# Patient Record
Sex: FEMALE | Race: WHITE | Employment: OTHER
[De-identification: names, ages, dates, MRNs, and addresses within clinical notes are randomized per-mention and may not be internally consistent; named-entity substitution may affect disease eponyms.]

---

## 2017-03-30 ENCOUNTER — MYAURORA ACCOUNT LINK (OUTPATIENT)
Dept: OTHER | Age: 72
End: 2017-03-30

## 2017-03-30 ENCOUNTER — PRIOR ORIGINAL RECORDS (OUTPATIENT)
Dept: OTHER | Age: 72
End: 2017-03-30

## 2017-03-30 ENCOUNTER — HOSPITAL ENCOUNTER (OUTPATIENT)
Dept: CARDIOLOGY CLINIC | Facility: HOSPITAL | Age: 72
Discharge: HOME | End: 2017-03-30
Attending: INTERNAL MEDICINE

## 2017-03-30 DIAGNOSIS — I65.23 BILATERAL CAROTID ARTERY STENOSIS: ICD-10-CM

## 2017-05-01 ENCOUNTER — PRIOR ORIGINAL RECORDS (OUTPATIENT)
Dept: OTHER | Age: 72
End: 2017-05-01

## 2017-05-01 ENCOUNTER — MYAURORA ACCOUNT LINK (OUTPATIENT)
Dept: OTHER | Age: 72
End: 2017-05-01

## 2017-05-16 ENCOUNTER — HOSPITAL (OUTPATIENT)
Dept: OTHER | Age: 72
End: 2017-05-16
Attending: OBSTETRICS & GYNECOLOGY

## 2017-10-16 PROBLEM — Z83.3 FH: DIABETES MELLITUS: Status: ACTIVE | Noted: 2017-10-16

## 2017-11-16 ENCOUNTER — HOSPITAL (OUTPATIENT)
Dept: OTHER | Age: 72
End: 2017-11-16
Attending: OBSTETRICS & GYNECOLOGY

## 2017-11-17 ENCOUNTER — LAB ENCOUNTER (OUTPATIENT)
Dept: LAB | Age: 72
End: 2017-11-17
Attending: INTERNAL MEDICINE
Payer: MEDICARE

## 2017-11-17 ENCOUNTER — PRIOR ORIGINAL RECORDS (OUTPATIENT)
Dept: OTHER | Age: 72
End: 2017-11-17

## 2017-11-17 DIAGNOSIS — E78.2 MIXED HYPERLIPIDEMIA: Primary | ICD-10-CM

## 2017-11-17 PROCEDURE — 36415 COLL VENOUS BLD VENIPUNCTURE: CPT

## 2017-11-17 PROCEDURE — 80061 LIPID PANEL: CPT

## 2017-11-17 PROCEDURE — 80053 COMPREHEN METABOLIC PANEL: CPT

## 2017-12-13 LAB
ALBUMIN: 3.9 G/DL
ALKALINE PHOSPHATATE(ALK PHOS): 78 IU/L
BILIRUBIN TOTAL: 0.7 MG/DL
BUN: 14 MG/DL
CALCIUM: 9 MG/DL
CHLORIDE: 107 MEQ/L
CHOLESTEROL, TOTAL: 187 MG/DL
CREATININE, SERUM: 0.78 MG/DL
GLUCOSE: 93 MG/DL
HDL CHOLESTEROL: 88 MG/DL
LDL CHOLESTEROL: 87 MG/DL
POTASSIUM, SERUM: 4.5 MEQ/L
PROTEIN, TOTAL: 7 G/DL
SGOT (AST): 25 IU/L
SGPT (ALT): 29 IU/L
SODIUM: 142 MEQ/L
TRIGLYCERIDES: 60 MG/DL

## 2018-05-07 ENCOUNTER — MYAURORA ACCOUNT LINK (OUTPATIENT)
Dept: OTHER | Age: 73
End: 2018-05-07

## 2018-05-07 ENCOUNTER — PRIOR ORIGINAL RECORDS (OUTPATIENT)
Dept: OTHER | Age: 73
End: 2018-05-07

## 2018-12-04 PROCEDURE — 86803 HEPATITIS C AB TEST: CPT | Performed by: INTERNAL MEDICINE

## 2018-12-04 PROCEDURE — 36415 COLL VENOUS BLD VENIPUNCTURE: CPT | Performed by: INTERNAL MEDICINE

## 2018-12-05 ENCOUNTER — LAB ENCOUNTER (OUTPATIENT)
Dept: LAB | Age: 73
End: 2018-12-05
Attending: INTERNAL MEDICINE
Payer: MEDICARE

## 2018-12-05 ENCOUNTER — PRIOR ORIGINAL RECORDS (OUTPATIENT)
Dept: OTHER | Age: 73
End: 2018-12-05

## 2018-12-05 DIAGNOSIS — E78.2 MIXED HYPERLIPIDEMIA: Primary | ICD-10-CM

## 2018-12-05 PROCEDURE — 80061 LIPID PANEL: CPT

## 2018-12-05 PROCEDURE — 36415 COLL VENOUS BLD VENIPUNCTURE: CPT

## 2018-12-07 LAB
CHOLESTEROL, TOTAL: 188 MG/DL
HDL CHOLESTEROL: 79 MG/DL
LDL CHOLESTEROL: 98 MG/DL
TRIGLYCERIDES: 54 MG/DL

## 2019-01-25 PROBLEM — D12.0 BENIGN NEOPLASM OF CECUM: Status: ACTIVE | Noted: 2019-01-25

## 2019-01-25 PROBLEM — K64.8 OTHER HEMORRHOIDS: Status: ACTIVE | Noted: 2019-01-25

## 2019-01-25 PROBLEM — B96.81 HELICOBACTER PYLORI (H. PYLORI) AS THE CAUSE OF DISEASES CLASSIFIED ELSEWHERE: Status: ACTIVE | Noted: 2019-01-25

## 2019-01-25 PROBLEM — Z86.0100 PERSONAL HISTORY OF COLONIC POLYPS: Status: ACTIVE | Noted: 2019-01-25

## 2019-01-25 PROBLEM — K63.5 POLYP OF COLON: Status: ACTIVE | Noted: 2019-01-25

## 2019-01-25 PROBLEM — Z86.010 PERSONAL HISTORY OF COLONIC POLYPS: Status: ACTIVE | Noted: 2019-01-25

## 2019-01-25 PROBLEM — D12.4 BENIGN NEOPLASM OF DESCENDING COLON: Status: ACTIVE | Noted: 2019-01-25

## 2019-02-27 ENCOUNTER — HOSPITAL (OUTPATIENT)
Dept: OTHER | Age: 74
End: 2019-02-27
Attending: OBSTETRICS & GYNECOLOGY

## 2019-02-28 VITALS
WEIGHT: 122 LBS | SYSTOLIC BLOOD PRESSURE: 122 MMHG | BODY MASS INDEX: 19.61 KG/M2 | HEIGHT: 66 IN | DIASTOLIC BLOOD PRESSURE: 70 MMHG | HEART RATE: 70 BPM

## 2019-03-01 VITALS
HEART RATE: 70 BPM | BODY MASS INDEX: 19.77 KG/M2 | DIASTOLIC BLOOD PRESSURE: 64 MMHG | WEIGHT: 123 LBS | SYSTOLIC BLOOD PRESSURE: 118 MMHG | HEIGHT: 66 IN

## 2019-03-25 RX ORDER — FEXOFENADINE HCL 180 MG/1
TABLET ORAL
COMMUNITY
Start: 2010-05-25

## 2019-03-25 RX ORDER — NAPROXEN SODIUM 220 MG
TABLET ORAL
COMMUNITY
Start: 2010-05-25 | End: 2020-06-08 | Stop reason: ALTCHOICE

## 2019-03-25 RX ORDER — PHENOL 1.4 %
AEROSOL, SPRAY (ML) MUCOUS MEMBRANE
COMMUNITY
Start: 2010-05-25 | End: 2020-06-08 | Stop reason: CLARIF

## 2019-03-25 RX ORDER — SIMVASTATIN 10 MG
TABLET ORAL
COMMUNITY
Start: 2018-05-22 | End: 2019-05-07 | Stop reason: SDUPTHER

## 2019-03-25 RX ORDER — ESTRADIOL 10 UG/1
INSERT VAGINAL
COMMUNITY
Start: 2010-05-25 | End: 2019-05-06 | Stop reason: SDUPTHER

## 2019-03-28 DIAGNOSIS — I65.23 BILATERAL CAROTID ARTERY OCCLUSION: Primary | ICD-10-CM

## 2019-04-23 ENCOUNTER — HOSPITAL ENCOUNTER (OUTPATIENT)
Dept: CARDIOLOGY CLINIC | Facility: HOSPITAL | Age: 74
Discharge: HOME | End: 2019-04-23
Attending: INTERNAL MEDICINE
Payer: MEDICARE

## 2019-04-23 DIAGNOSIS — I65.23 BILATERAL CAROTID ARTERY STENOSIS: ICD-10-CM

## 2019-04-23 PROCEDURE — 93880 EXTRACRANIAL BILAT STUDY: CPT | Performed by: INTERNAL MEDICINE

## 2019-04-24 ENCOUNTER — TELEPHONE (OUTPATIENT)
Dept: CARDIOLOGY | Age: 74
End: 2019-04-24

## 2019-04-24 DIAGNOSIS — I65.23 BILATERAL CAROTID ARTERY OCCLUSION: ICD-10-CM

## 2019-05-07 ENCOUNTER — OFFICE VISIT (OUTPATIENT)
Dept: CARDIOLOGY | Age: 74
End: 2019-05-07

## 2019-05-07 DIAGNOSIS — E78.2 HYPERLIPIDEMIA, MIXED: ICD-10-CM

## 2019-05-07 DIAGNOSIS — I65.23 ASYMPTOMATIC CAROTID ARTERY STENOSIS, BILATERAL: Primary | ICD-10-CM

## 2019-05-07 PROBLEM — E78.5 DYSLIPIDEMIA: Status: ACTIVE | Noted: 2019-05-07

## 2019-05-07 PROCEDURE — 99214 OFFICE O/P EST MOD 30 MIN: CPT | Performed by: NURSE PRACTITIONER

## 2019-05-07 RX ORDER — SIMVASTATIN 10 MG
10 TABLET ORAL AT BEDTIME
Qty: 90 TABLET | Refills: 3 | Status: SHIPPED | OUTPATIENT
Start: 2019-05-07 | End: 2020-07-10

## 2019-05-07 SDOH — HEALTH STABILITY: PHYSICAL HEALTH: ON AVERAGE, HOW MANY DAYS PER WEEK DO YOU ENGAGE IN MODERATE TO STRENUOUS EXERCISE (LIKE A BRISK WALK)?: 3 DAYS

## 2019-05-07 SDOH — HEALTH STABILITY: PHYSICAL HEALTH: ON AVERAGE, HOW MANY MINUTES DO YOU ENGAGE IN EXERCISE AT THIS LEVEL?: 60 MIN

## 2019-05-07 ASSESSMENT — PATIENT HEALTH QUESTIONNAIRE - PHQ9
1. LITTLE INTEREST OR PLEASURE IN DOING THINGS: NOT AT ALL
2. FEELING DOWN, DEPRESSED OR HOPELESS: NOT AT ALL
SUM OF ALL RESPONSES TO PHQ9 QUESTIONS 1 AND 2: 0
SUM OF ALL RESPONSES TO PHQ9 QUESTIONS 1 AND 2: 0

## 2019-05-07 ASSESSMENT — ENCOUNTER SYMPTOMS
EYES NEGATIVE: 1
CONSTITUTIONAL NEGATIVE: 1
NEUROLOGICAL NEGATIVE: 1
ENDOCRINE NEGATIVE: 1
GASTROINTESTINAL NEGATIVE: 1
HEMATOLOGIC/LYMPHATIC NEGATIVE: 1
RESPIRATORY NEGATIVE: 1

## 2019-05-07 ASSESSMENT — PAIN SCALES - GENERAL: PAINLEVEL: 0

## 2019-08-10 ENCOUNTER — HOSPITAL ENCOUNTER (OUTPATIENT)
Age: 74
Discharge: HOME | End: 2019-08-10
Attending: FAMILY MEDICINE
Payer: MEDICARE

## 2019-08-10 VITALS
OXYGEN SATURATION: 97 % | TEMPERATURE: 98 F | HEART RATE: 85 BPM | SYSTOLIC BLOOD PRESSURE: 125 MMHG | DIASTOLIC BLOOD PRESSURE: 53 MMHG | RESPIRATION RATE: 16 BRPM

## 2019-08-10 DIAGNOSIS — J06.9 VIRAL UPPER RESPIRATORY TRACT INFECTION: Primary | ICD-10-CM

## 2019-08-10 PROCEDURE — 99212 OFFICE O/P EST SF 10 MIN: CPT

## 2019-08-10 PROCEDURE — 99213 OFFICE O/P EST LOW 20 MIN: CPT

## 2019-08-10 RX ORDER — FLUTICASONE PROPIONATE 50 MCG
SPRAY, SUSPENSION (ML) NASAL
Qty: 1 INHALER | Refills: 0 | Status: SHIPPED | OUTPATIENT
Start: 2019-08-10 | End: 2020-01-23

## 2019-08-10 NOTE — ED INITIAL ASSESSMENT (HPI)
Complains of head and nose congestion on and off for the last two days accompanied by cough. Denies sore throat. Feeling fatigued. Denies urinary symptoms.

## 2019-08-10 NOTE — ED PROVIDER NOTES
Patient Seen in: [facility]    History   Patient presents with:  Nasal Congestion    Stated Complaint: FEVER/CONGESTION/CHILLS X 2 DAYS    HPI    This 61-year-old female presents to the office with a 2-day history of shaking chi (36.8 °C)   Temp src Oral   SpO2 97 %   O2 Device None (Room air)       Current:/53   Pulse 85   Temp 98.3 °F (36.8 °C) (Oral)   Resp 16   LMP  (LMP Unknown)   SpO2 97%         Physical Exam    General: WH/WN/WD, in no respiratory distress, A and O t as you are congested. Push fluids. Humidified Air. Saline nasal spray. You may use a Neti pot for sinus rinses with saline. You may alternate Tylenol with Ibuprofen every 3 hours for fever or pain.   Go to the closest Emergency Department if you develop l

## 2019-12-13 ENCOUNTER — LAB ENCOUNTER (OUTPATIENT)
Dept: LAB | Age: 74
End: 2019-12-13
Attending: NURSE PRACTITIONER
Payer: MEDICARE

## 2019-12-13 DIAGNOSIS — E78.2 MIXED HYPERLIPIDEMIA: Primary | ICD-10-CM

## 2019-12-13 DIAGNOSIS — E78.5 DYSLIPIDEMIA: ICD-10-CM

## 2019-12-13 DIAGNOSIS — Z83.3 FH: DIABETES MELLITUS: ICD-10-CM

## 2019-12-13 LAB
ALBUMIN SERPL-MCNC: 3.8 G/DL
ALBUMIN/GLOB SERPL: NORMAL {RATIO}
ALP SERPL-CCNC: 75 U/L
ALT SERPL-CCNC: 22 U/L
ANION GAP SERPL CALC-SCNC: NORMAL MMOL/L
AST SERPL-CCNC: 21 U/L
BILIRUB SERPL-MCNC: 0.6 MG/DL
BUN SERPL-MCNC: 10 MG/DL
BUN/CREAT SERPL: NORMAL
CALCIUM SERPL-MCNC: 9 MG/DL
CHLORIDE SERPL-SCNC: 109 MMOL/L
CHOLEST SERPL-MCNC: 180 MG/DL
CHOLEST/HDLC SERPL: NORMAL {RATIO}
CO2 SERPL-SCNC: NORMAL MMOL/L
CREAT SERPL-MCNC: 0.8 MG/DL
GLOBULIN SER-MCNC: 3.1 G/DL
GLUCOSE SERPL-MCNC: 91 MG/DL
HDLC SERPL-MCNC: 74 MG/DL
LDLC SERPL CALC-MCNC: 95 MG/DL
LENGTH OF FAST TIME PATIENT: NORMAL H
LENGTH OF FAST TIME PATIENT: NORMAL H
NONHDLC SERPL-MCNC: 106 MG/DL
POTASSIUM SERPL-SCNC: 4.5 MMOL/L
PROT SERPL-MCNC: 6.9 G/DL
SODIUM SERPL-SCNC: 142 MMOL/L
TRIGL SERPL-MCNC: 55 MG/DL
VLDLC SERPL CALC-MCNC: NORMAL MG/DL

## 2019-12-13 PROCEDURE — 36415 COLL VENOUS BLD VENIPUNCTURE: CPT

## 2019-12-13 PROCEDURE — 80053 COMPREHEN METABOLIC PANEL: CPT

## 2019-12-13 PROCEDURE — 80061 LIPID PANEL: CPT

## 2019-12-17 ENCOUNTER — E-ADVICE (OUTPATIENT)
Dept: CARDIOLOGY | Age: 74
End: 2019-12-17

## 2019-12-23 ENCOUNTER — TELEPHONE (OUTPATIENT)
Dept: CARDIOLOGY | Age: 74
End: 2019-12-23

## 2019-12-30 ENCOUNTER — CLINICAL ABSTRACT (OUTPATIENT)
Dept: CARDIOLOGY | Age: 74
End: 2019-12-30

## 2019-12-30 DIAGNOSIS — E78.2 HYPERLIPIDEMIA, MIXED: ICD-10-CM

## 2020-01-06 ENCOUNTER — CLINICAL ABSTRACT (OUTPATIENT)
Dept: CARDIOLOGY | Age: 75
End: 2020-01-06

## 2020-01-06 DIAGNOSIS — E78.2 HYPERLIPIDEMIA, MIXED: ICD-10-CM

## 2020-02-13 ENCOUNTER — HOSPITAL ENCOUNTER (OUTPATIENT)
Dept: CT IMAGING | Facility: HOSPITAL | Age: 75
Discharge: HOME | End: 2020-02-13
Attending: INTERNAL MEDICINE

## 2020-02-13 DIAGNOSIS — Z13.9 ENCOUNTER FOR SCREENING: ICD-10-CM

## 2020-02-14 ENCOUNTER — E-ADVICE (OUTPATIENT)
Dept: CARDIOLOGY | Age: 75
End: 2020-02-14

## 2020-02-15 ENCOUNTER — E-ADVICE (OUTPATIENT)
Dept: CARDIOLOGY | Age: 75
End: 2020-02-15

## 2020-02-17 ENCOUNTER — TELEPHONE (OUTPATIENT)
Dept: CARDIOLOGY | Age: 75
End: 2020-02-17

## 2020-02-19 PROBLEM — R91.8 PULMONARY NODULES: Status: ACTIVE | Noted: 2020-02-19

## 2020-03-01 PROBLEM — M54.50 CHRONIC BILATERAL LOW BACK PAIN WITHOUT SCIATICA: Status: ACTIVE | Noted: 2020-03-01

## 2020-03-01 PROBLEM — G89.29 CHRONIC BILATERAL LOW BACK PAIN WITHOUT SCIATICA: Status: ACTIVE | Noted: 2020-03-01

## 2020-03-06 ENCOUNTER — HOSPITAL ENCOUNTER (OUTPATIENT)
Dept: GENERAL RADIOLOGY | Age: 75
Discharge: HOME | End: 2020-03-06
Attending: OBSTETRICS & GYNECOLOGY

## 2020-03-06 ENCOUNTER — HOSPITAL ENCOUNTER (OUTPATIENT)
Dept: GENERAL RADIOLOGY | Age: 75
End: 2020-03-06
Attending: OBSTETRICS & GYNECOLOGY

## 2020-03-06 ENCOUNTER — HOSPITAL ENCOUNTER (OUTPATIENT)
Dept: ULTRASOUND IMAGING | Age: 75
Discharge: HOME | End: 2020-03-06
Attending: OBSTETRICS & GYNECOLOGY

## 2020-03-06 DIAGNOSIS — N83.201 RIGHT OVARIAN CYST: ICD-10-CM

## 2020-03-06 DIAGNOSIS — M85.89 OTHER SPECIFIED DISORDERS OF BONE DENSITY AND STRUCTURE, MULTIPLE SITES: ICD-10-CM

## 2020-03-06 PROCEDURE — 77080 DXA BONE DENSITY AXIAL: CPT

## 2020-03-06 PROCEDURE — 76830 TRANSVAGINAL US NON-OB: CPT

## 2020-05-11 ENCOUNTER — APPOINTMENT (OUTPATIENT)
Dept: CARDIOLOGY | Age: 75
End: 2020-05-11

## 2020-06-08 ENCOUNTER — OFFICE VISIT (OUTPATIENT)
Dept: CARDIOLOGY | Age: 75
End: 2020-06-08

## 2020-06-08 VITALS
HEIGHT: 66 IN | HEART RATE: 66 BPM | BODY MASS INDEX: 19.61 KG/M2 | DIASTOLIC BLOOD PRESSURE: 60 MMHG | SYSTOLIC BLOOD PRESSURE: 120 MMHG | WEIGHT: 122 LBS

## 2020-06-08 DIAGNOSIS — E78.2 HYPERLIPIDEMIA, MIXED: ICD-10-CM

## 2020-06-08 DIAGNOSIS — I65.23 ASYMPTOMATIC CAROTID ARTERY STENOSIS, BILATERAL: Primary | ICD-10-CM

## 2020-06-08 DIAGNOSIS — E55.9 VITAMIN D DEFICIENCY: ICD-10-CM

## 2020-06-08 DIAGNOSIS — R91.8 LUNG NODULES: ICD-10-CM

## 2020-06-08 PROCEDURE — 99441 TELEPHONE E&M BY PHYSICIAN EST PT NOT ORIG PREV 7 DAYS 5-10 MIN: CPT | Performed by: INTERNAL MEDICINE

## 2020-06-30 PROBLEM — M54.59 MECHANICAL LOW BACK PAIN: Status: ACTIVE | Noted: 2020-06-30

## 2020-07-10 RX ORDER — SIMVASTATIN 10 MG
TABLET ORAL
Qty: 90 TABLET | Refills: 2 | Status: SHIPPED | OUTPATIENT
Start: 2020-07-10 | End: 2020-12-17 | Stop reason: SDUPTHER

## 2020-07-27 ENCOUNTER — HOSPITAL ENCOUNTER (OUTPATIENT)
Dept: CT IMAGING | Facility: HOSPITAL | Age: 75
Discharge: HOME | End: 2020-07-27
Attending: NURSE PRACTITIONER
Payer: MEDICARE

## 2020-07-27 DIAGNOSIS — E55.9 VITAMIN D DEFICIENCY: ICD-10-CM

## 2020-07-27 DIAGNOSIS — I65.23 ASYMPTOMATIC BILATERAL CAROTID ARTERY STENOSIS: ICD-10-CM

## 2020-07-27 DIAGNOSIS — E78.2 HYPERLIPIDEMIA, MIXED: ICD-10-CM

## 2020-07-27 DIAGNOSIS — R91.1 LUNG NODULE: ICD-10-CM

## 2020-07-27 PROCEDURE — 71250 CT THORAX DX C-: CPT | Performed by: NURSE PRACTITIONER

## 2020-07-28 ENCOUNTER — E-ADVICE (OUTPATIENT)
Dept: CARDIOLOGY | Age: 75
End: 2020-07-28

## 2020-08-08 DIAGNOSIS — E55.9 VITAMIN D DEFICIENCY: ICD-10-CM

## 2020-08-08 DIAGNOSIS — I65.23 ASYMPTOMATIC CAROTID ARTERY STENOSIS, BILATERAL: ICD-10-CM

## 2020-08-08 DIAGNOSIS — E78.2 HYPERLIPIDEMIA, MIXED: ICD-10-CM

## 2020-08-08 DIAGNOSIS — R91.8 LUNG NODULES: ICD-10-CM

## 2020-08-08 PROCEDURE — X1094 CT CHEST WO CONTRAST: HCPCS | Performed by: NURSE PRACTITIONER

## 2020-08-11 PROBLEM — J47.9 BRONCHIECTASIS WITHOUT COMPLICATION (HCC): Status: ACTIVE | Noted: 2020-08-11

## 2020-09-07 ENCOUNTER — HOSPITAL ENCOUNTER (OUTPATIENT)
Age: 75
Discharge: HOME | End: 2020-09-07
Payer: MEDICARE

## 2020-09-07 VITALS
HEART RATE: 70 BPM | RESPIRATION RATE: 18 BRPM | TEMPERATURE: 99 F | OXYGEN SATURATION: 99 % | SYSTOLIC BLOOD PRESSURE: 124 MMHG | DIASTOLIC BLOOD PRESSURE: 55 MMHG
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
## 2020-09-07 DIAGNOSIS — L08.9 INFECTED INSECT BITES OF MULTIPLE SITES: Primary | ICD-10-CM

## 2020-09-07 DIAGNOSIS — T07.XXXA INFECTED INSECT BITES OF MULTIPLE SITES: Primary | ICD-10-CM

## 2020-09-07 PROCEDURE — 99213 OFFICE O/P EST LOW 20 MIN: CPT

## 2020-09-07 PROCEDURE — 99214 OFFICE O/P EST MOD 30 MIN: CPT

## 2020-09-07 RX ORDER — CEPHALEXIN 500 MG/1
500 CAPSULE ORAL 4 TIMES DAILY
Qty: 40 CAPSULE | Refills: 0 | Status: SHIPPED | OUTPATIENT
Start: 2020-09-07 | End: 2020-09-17

## 2020-09-07 RX ORDER — DEXAMETHASONE 4 MG/1
16 TABLET ORAL ONCE
Status: COMPLETED | OUTPATIENT
Start: 2020-09-07 | End: 2020-09-07

## 2020-09-07 RX ORDER — FAMOTIDINE 20 MG/1
20 TABLET ORAL ONCE
Status: COMPLETED | OUTPATIENT
Start: 2020-09-07 | End: 2020-09-07

## 2020-09-07 NOTE — ED INITIAL ASSESSMENT (HPI)
Pt with multiple insect bites to body on Sat night; pt concerned of increased swelling/redness to bite on L thigh

## 2020-09-07 NOTE — ED PROVIDER NOTES
Patient Seen in: [facility]      History   Patient presents with:  Bite Sting,Insect    Stated Complaint: bites on body    HPI    81-year-old female here with complaint of multiple red welts to her lower extremities and one on t Alcohol use: Yes      Alcohol/week: 5.0 standard drinks      Types: 5 Glasses of wine per week    Drug use: No             Review of Systems    Positive for stated complaint: bites on body  Other systems are as noted in HPI.   Constitutional and vital sig Thought Content:  Thought content normal.         Judgment: Judgment normal.     see image            ED Course                  MDM     Clinical Impression: multiple insect bites with cellulitis  Course of Treatment: Take the full course of antibiotics

## 2020-12-16 ENCOUNTER — E-ADVICE (OUTPATIENT)
Dept: CARDIOLOGY | Age: 75
End: 2020-12-16

## 2020-12-17 RX ORDER — SIMVASTATIN 10 MG
10 TABLET ORAL AT BEDTIME
Qty: 90 TABLET | Refills: 2 | Status: SHIPPED | OUTPATIENT
Start: 2020-12-17

## 2020-12-23 ENCOUNTER — E-ADVICE (OUTPATIENT)
Dept: CARDIOLOGY | Age: 75
End: 2020-12-23

## 2021-02-01 ENCOUNTER — E-ADVICE (OUTPATIENT)
Dept: CARDIOLOGY | Age: 76
End: 2021-02-01

## 2021-02-04 ENCOUNTER — IMMUNIZATION (OUTPATIENT)
Dept: LAB | Age: 76
End: 2021-02-04

## 2021-02-04 DIAGNOSIS — Z23 NEED FOR VACCINATION: Primary | ICD-10-CM

## 2021-02-04 PROCEDURE — 0001A COVID 19 PFIZER-BIONTECH: CPT

## 2021-02-04 PROCEDURE — 91300 COVID 19 PFIZER-BIONTECH: CPT

## 2021-02-25 ENCOUNTER — IMMUNIZATION (OUTPATIENT)
Dept: LAB | Age: 76
End: 2021-02-25

## 2021-02-25 DIAGNOSIS — Z23 NEED FOR VACCINATION: Primary | ICD-10-CM

## 2021-02-25 PROCEDURE — 0002A COVID 19 PFIZER-BIONTECH: CPT | Performed by: HOSPITALIST

## 2021-02-25 PROCEDURE — 91300 COVID 19 PFIZER-BIONTECH: CPT | Performed by: HOSPITALIST

## 2021-03-16 ENCOUNTER — HOSPITAL ENCOUNTER (OUTPATIENT)
Dept: ULTRASOUND IMAGING | Age: 76
Discharge: HOME | End: 2021-03-16
Attending: OBSTETRICS & GYNECOLOGY

## 2021-03-16 DIAGNOSIS — N83.201 RIGHT OVARIAN CYST: ICD-10-CM

## 2021-03-16 PROCEDURE — 76856 US EXAM PELVIC COMPLETE: CPT

## 2021-03-22 PROBLEM — R20.8 DYSESTHESIA OF SCALP: Status: ACTIVE | Noted: 2021-03-22

## 2021-03-22 PROBLEM — L21.9 SEBORRHEIC DERMATITIS, UNSPECIFIED: Status: ACTIVE | Noted: 2021-03-22

## 2021-05-14 ENCOUNTER — HOSPITAL ENCOUNTER (OUTPATIENT)
Dept: CARDIOLOGY CLINIC | Facility: HOSPITAL | Age: 76
Discharge: HOME | End: 2021-05-14
Attending: NURSE PRACTITIONER
Payer: MEDICARE

## 2021-05-14 DIAGNOSIS — E78.2 MIXED HYPERLIPIDEMIA: ICD-10-CM

## 2021-05-14 DIAGNOSIS — I65.23 ASYMPTOMATIC CAROTID ARTERY STENOSIS, BILATERAL: ICD-10-CM

## 2021-05-14 DIAGNOSIS — E55.9 VITAMIN D DEFICIENCY: ICD-10-CM

## 2021-05-14 PROCEDURE — 93880 EXTRACRANIAL BILAT STUDY: CPT | Performed by: INTERNAL MEDICINE

## 2021-05-25 VITALS
HEART RATE: 66 BPM | BODY MASS INDEX: 19.77 KG/M2 | SYSTOLIC BLOOD PRESSURE: 104 MMHG | WEIGHT: 123 LBS | HEIGHT: 66 IN | DIASTOLIC BLOOD PRESSURE: 66 MMHG

## 2021-06-14 ENCOUNTER — OFFICE VISIT (OUTPATIENT)
Dept: CARDIOLOGY | Age: 76
End: 2021-06-14

## 2021-06-14 VITALS
BODY MASS INDEX: 19.13 KG/M2 | HEART RATE: 64 BPM | WEIGHT: 119 LBS | DIASTOLIC BLOOD PRESSURE: 68 MMHG | HEIGHT: 66 IN | SYSTOLIC BLOOD PRESSURE: 120 MMHG

## 2021-06-14 DIAGNOSIS — R91.8 PULMONARY NODULES: Primary | ICD-10-CM

## 2021-06-14 DIAGNOSIS — E78.2 HYPERLIPIDEMIA, MIXED: ICD-10-CM

## 2021-06-14 DIAGNOSIS — I65.23 ASYMPTOMATIC CAROTID ARTERY STENOSIS, BILATERAL: ICD-10-CM

## 2021-06-14 PROCEDURE — 99215 OFFICE O/P EST HI 40 MIN: CPT | Performed by: INTERNAL MEDICINE

## 2021-06-14 ASSESSMENT — ENCOUNTER SYMPTOMS
BRUISES/BLEEDS EASILY: 0
HEMOPTYSIS: 0
SUSPICIOUS LESIONS: 0
FEVER: 0
CHILLS: 0
WEIGHT GAIN: 0
ALLERGIC/IMMUNOLOGIC COMMENTS: NO NEW FOOD ALLERGIES
COUGH: 0
HEMATOCHEZIA: 0
WEIGHT LOSS: 0

## 2021-06-14 ASSESSMENT — PATIENT HEALTH QUESTIONNAIRE - PHQ9
CLINICAL INTERPRETATION OF PHQ9 SCORE: NO FURTHER SCREENING NEEDED
SUM OF ALL RESPONSES TO PHQ9 QUESTIONS 1 AND 2: 0
SUM OF ALL RESPONSES TO PHQ9 QUESTIONS 1 AND 2: 0
1. LITTLE INTEREST OR PLEASURE IN DOING THINGS: NOT AT ALL
2. FEELING DOWN, DEPRESSED OR HOPELESS: NOT AT ALL
CLINICAL INTERPRETATION OF PHQ2 SCORE: NO FURTHER SCREENING NEEDED

## 2021-06-18 ENCOUNTER — TELEPHONE (OUTPATIENT)
Dept: CARDIOLOGY | Age: 76
End: 2021-06-18

## 2021-06-18 DIAGNOSIS — E78.2 HYPERLIPIDEMIA, MIXED: Primary | ICD-10-CM

## 2021-06-25 ENCOUNTER — TELEPHONE (OUTPATIENT)
Dept: CARDIOLOGY | Age: 76
End: 2021-06-25

## 2021-06-29 PROBLEM — E04.1 NODULAR THYROID DISEASE: Status: ACTIVE | Noted: 2021-06-29

## 2021-08-02 ENCOUNTER — HOSPITAL ENCOUNTER (OUTPATIENT)
Dept: CT IMAGING | Facility: HOSPITAL | Age: 76
Discharge: HOME | End: 2021-08-02
Attending: INTERNAL MEDICINE
Payer: MEDICARE

## 2021-08-02 DIAGNOSIS — R91.8 PULMONARY NODULES: ICD-10-CM

## 2021-08-02 LAB — CREAT BLD-MCNC: 0.8 MG/DL

## 2021-08-02 PROCEDURE — 82565 ASSAY OF CREATININE: CPT

## 2021-08-02 PROCEDURE — 71275 CT ANGIOGRAPHY CHEST: CPT | Performed by: INTERNAL MEDICINE

## 2021-08-06 ENCOUNTER — TELEPHONE (OUTPATIENT)
Dept: CARDIOLOGY | Age: 76
End: 2021-08-06

## 2021-08-12 ENCOUNTER — TELEPHONE (OUTPATIENT)
Dept: CARDIOLOGY | Age: 76
End: 2021-08-12

## 2021-08-16 ENCOUNTER — HOSPITAL ENCOUNTER (OUTPATIENT)
Age: 76
Discharge: HOME | End: 2021-08-16
Payer: MEDICARE

## 2021-08-16 VITALS
SYSTOLIC BLOOD PRESSURE: 122 MMHG | RESPIRATION RATE: 16 BRPM | HEART RATE: 65 BPM | TEMPERATURE: 99 F | OXYGEN SATURATION: 99 % | DIASTOLIC BLOOD PRESSURE: 77 MMHG
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
## 2021-08-16 PROCEDURE — 99213 OFFICE O/P EST LOW 20 MIN: CPT

## 2021-08-16 NOTE — ED INITIAL ASSESSMENT (HPI)
Patient presents to IC with c/o insect bites to back of both her legs x 2 days. +Redness and itching. No lip swelling or throat swelling. Due to fly to [place] next week.

## 2021-08-16 NOTE — ED PROVIDER NOTES
Patient Seen in: [facility] [place]      History   Patient presents with:  Bite  Redness    Stated Complaint: Insect bite/both legs    HPI/Subjective:   HPI    CHIEF COMPLAINT: Insect bites to the thighs     HISTORY OF PRESENT ILLNESS: Patient is • APPENDECTOMY     • BENIGN BIOPSY LEFT      fatty nodule   • COLONOSCOPY     • OTHER SURGICAL HISTORY      cervical biopsy   • TONSILLECTOMY                  Social History    Tobacco Use      Smoking status: Former Smoker        Packs/day: 1.50 appears to have a total of 7 insect bites. She is having a localized reaction to these bites. Will prescribe triamcinolone ointment twice daily as directed. She can do an over-the-counter antihistamine. Follow with primary care.   If there are any new,

## 2021-08-25 PROBLEM — Z87.42 HISTORY OF OVARIAN CYST: Status: ACTIVE | Noted: 2021-08-25

## 2022-01-23 ENCOUNTER — LAB ENCOUNTER (OUTPATIENT)
Dept: LAB | Facility: HOSPITAL | Age: 77
End: 2022-01-23
Attending: INTERNAL MEDICINE
Payer: MEDICARE

## 2022-01-23 DIAGNOSIS — Z01.818 PREOP TESTING: ICD-10-CM

## 2022-01-24 LAB — SARS-COV-2 RNA RESP QL NAA+PROBE: NOT DETECTED

## 2022-01-26 PROBLEM — B96.81 GASTROINTESTINAL ULCER DUE TO HELICOBACTER PYLORI: Status: ACTIVE | Noted: 2022-01-26

## 2022-01-26 PROBLEM — K29.30 CHRONIC SUPERFICIAL GASTRITIS WITHOUT BLEEDING: Status: ACTIVE | Noted: 2022-01-26

## 2022-01-26 PROBLEM — K29.40 ATROPHIC GASTRITIS WITHOUT HEMORRHAGE: Status: ACTIVE | Noted: 2022-01-26

## 2022-01-26 PROBLEM — K28.9 GASTROINTESTINAL ULCER DUE TO HELICOBACTER PYLORI: Status: ACTIVE | Noted: 2022-01-26

## 2022-03-21 ENCOUNTER — HOSPITAL ENCOUNTER (OUTPATIENT)
Dept: ULTRASOUND IMAGING | Age: 77
Discharge: HOME | End: 2022-03-21
Attending: OBSTETRICS & GYNECOLOGY

## 2022-03-21 DIAGNOSIS — N83.201 RIGHT OVARIAN CYST: ICD-10-CM

## 2022-03-21 PROCEDURE — 76830 TRANSVAGINAL US NON-OB: CPT

## 2022-05-12 ENCOUNTER — LAB ENCOUNTER (OUTPATIENT)
Dept: LAB | Age: 77
End: 2022-05-12
Attending: NURSE PRACTITIONER
Payer: MEDICARE

## 2022-05-12 DIAGNOSIS — E78.2 MIXED HYPERLIPIDEMIA: ICD-10-CM

## 2022-05-12 DIAGNOSIS — I65.23 ASYMPTOMATIC BILATERAL CAROTID ARTERY STENOSIS: Primary | ICD-10-CM

## 2022-05-12 LAB
CHOLEST SERPL-MCNC: 185 MG/DL (ref ?–200)
FASTING PATIENT LIPID ANSWER: YES
HDLC SERPL-MCNC: 84 MG/DL (ref 40–59)
LDLC SERPL CALC-MCNC: 89 MG/DL (ref ?–100)
NONHDLC SERPL-MCNC: 101 MG/DL (ref ?–130)
TRIGL SERPL-MCNC: 63 MG/DL (ref 30–149)
VLDLC SERPL CALC-MCNC: 10 MG/DL (ref 0–30)

## 2022-05-12 PROCEDURE — 36415 COLL VENOUS BLD VENIPUNCTURE: CPT

## 2022-05-12 PROCEDURE — 80061 LIPID PANEL: CPT

## 2022-05-21 ENCOUNTER — HOSPITAL ENCOUNTER (OUTPATIENT)
Age: 77
Discharge: HOME | End: 2022-05-21
Payer: MEDICARE

## 2022-05-21 ENCOUNTER — APPOINTMENT (OUTPATIENT)
Dept: GENERAL RADIOLOGY | Age: 77
End: 2022-05-21
Attending: NURSE PRACTITIONER
Payer: MEDICARE

## 2022-05-21 VITALS
HEART RATE: 59 BPM | TEMPERATURE: 98 F | SYSTOLIC BLOOD PRESSURE: 122 MMHG | WEIGHT: 120 LBS | DIASTOLIC BLOOD PRESSURE: 59 MMHG | HEIGHT: 66 IN | RESPIRATION RATE: 18 BRPM | OXYGEN SATURATION: 97 % | BODY MASS INDEX: 19.29 KG/M2

## 2022-05-21 DIAGNOSIS — B34.9 VIRAL SYNDROME: Primary | ICD-10-CM

## 2022-05-21 DIAGNOSIS — L29.9 EXTERNAL AUDITORY CANAL PRURITUS: ICD-10-CM

## 2022-05-21 LAB — SARS-COV-2 RNA RESP QL NAA+PROBE: NOT DETECTED

## 2022-05-21 PROCEDURE — 99214 OFFICE O/P EST MOD 30 MIN: CPT

## 2022-05-21 PROCEDURE — 71046 X-RAY EXAM CHEST 2 VIEWS: CPT | Performed by: NURSE PRACTITIONER

## 2022-05-21 RX ORDER — ALBUTEROL SULFATE 90 UG/1
2 AEROSOL, METERED RESPIRATORY (INHALATION) EVERY 4 HOURS PRN
Qty: 1 EACH | Refills: 0 | Status: SHIPPED | OUTPATIENT
Start: 2022-05-21 | End: 2022-06-20

## 2022-05-21 RX ORDER — PREDNISONE 20 MG/1
40 TABLET ORAL DAILY
Qty: 10 TABLET | Refills: 0 | Status: SHIPPED | OUTPATIENT
Start: 2022-05-21 | End: 2022-05-26

## 2022-05-21 RX ORDER — NEOMYCIN SULFATE, POLYMYXIN B SULFATE AND HYDROCORTISONE 10; 3.5; 1 MG/ML; MG/ML; [USP'U]/ML
3 SUSPENSION/ DROPS AURICULAR (OTIC) 3 TIMES DAILY
Qty: 1 EACH | Refills: 0 | Status: SHIPPED | OUTPATIENT
Start: 2022-05-21 | End: 2022-05-28

## 2022-05-21 NOTE — ED INITIAL ASSESSMENT (HPI)
Pt presents today with c/o non-productive cough and sore throat since Monday or Tuesday. Pt states that her temp was 99.2 last night. Pt states that she took an at home covid test last night that was negative.

## 2022-06-08 ENCOUNTER — HOSPITAL ENCOUNTER (OUTPATIENT)
Age: 77
Discharge: HOME | End: 2022-06-08
Payer: MEDICARE

## 2022-06-08 ENCOUNTER — APPOINTMENT (OUTPATIENT)
Dept: GENERAL RADIOLOGY | Age: 77
End: 2022-06-08
Attending: NURSE PRACTITIONER
Payer: MEDICARE

## 2022-06-08 VITALS
SYSTOLIC BLOOD PRESSURE: 131 MMHG | DIASTOLIC BLOOD PRESSURE: 64 MMHG | BODY MASS INDEX: 19.29 KG/M2 | WEIGHT: 120 LBS | OXYGEN SATURATION: 99 % | RESPIRATION RATE: 16 BRPM | TEMPERATURE: 98 F | HEART RATE: 62 BPM | HEIGHT: 66 IN

## 2022-06-08 DIAGNOSIS — S93.402A MODERATE LEFT ANKLE SPRAIN, INITIAL ENCOUNTER: Primary | ICD-10-CM

## 2022-06-08 PROCEDURE — 99213 OFFICE O/P EST LOW 20 MIN: CPT

## 2022-06-08 PROCEDURE — 99214 OFFICE O/P EST MOD 30 MIN: CPT

## 2022-06-08 PROCEDURE — 73630 X-RAY EXAM OF FOOT: CPT | Performed by: NURSE PRACTITIONER

## 2022-06-08 PROCEDURE — 73610 X-RAY EXAM OF ANKLE: CPT | Performed by: NURSE PRACTITIONER

## 2022-06-08 RX ORDER — HYDROCODONE BITARTRATE AND ACETAMINOPHEN 5; 325 MG/1; MG/1
1-2 TABLET ORAL EVERY 6 HOURS PRN
Qty: 10 TABLET | Refills: 0 | Status: SHIPPED | OUTPATIENT
Start: 2022-06-08 | End: 2022-06-13

## 2022-06-08 NOTE — ED INITIAL ASSESSMENT (HPI)
Pt with pain to arch of L foot and to L lateral ankle since this morning; pt unsure of actual injury but was gardening on even ground this morning and then doing stretches in yoga

## 2022-06-20 ENCOUNTER — APPOINTMENT (OUTPATIENT)
Dept: CARDIOLOGY | Age: 77
End: 2022-06-20

## 2023-04-04 DIAGNOSIS — N83.202 LEFT OVARIAN CYST: Primary | ICD-10-CM

## 2023-05-04 ENCOUNTER — HOSPITAL ENCOUNTER (OUTPATIENT)
Dept: ULTRASOUND IMAGING | Age: 78
Discharge: HOME | End: 2023-05-04
Attending: OBSTETRICS & GYNECOLOGY

## 2023-05-04 DIAGNOSIS — N83.202 LEFT OVARIAN CYST: ICD-10-CM

## 2023-05-04 PROCEDURE — 76830 TRANSVAGINAL US NON-OB: CPT

## 2023-06-09 ENCOUNTER — LAB ENCOUNTER (OUTPATIENT)
Dept: LAB | Age: 78
End: 2023-06-09
Attending: INTERNAL MEDICINE
Payer: MEDICARE

## 2023-06-09 DIAGNOSIS — I65.23 BILATERAL CAROTID ARTERY STENOSIS: Primary | ICD-10-CM

## 2023-06-09 DIAGNOSIS — E78.2 MIXED HYPERLIPIDEMIA: ICD-10-CM

## 2023-06-09 LAB
CHOLEST SERPL-MCNC: 178 MG/DL (ref ?–200)
FASTING PATIENT LIPID ANSWER: YES
HDLC SERPL-MCNC: 83 MG/DL (ref 40–59)
LDLC SERPL CALC-MCNC: 85 MG/DL (ref ?–100)
NONHDLC SERPL-MCNC: 95 MG/DL (ref ?–130)
TRIGL SERPL-MCNC: 50 MG/DL (ref 30–149)
VLDLC SERPL CALC-MCNC: 8 MG/DL (ref 0–30)

## 2023-06-09 PROCEDURE — 36415 COLL VENOUS BLD VENIPUNCTURE: CPT

## 2023-06-09 PROCEDURE — 80061 LIPID PANEL: CPT

## 2024-01-31 PROBLEM — D12.5 BENIGN NEOPLASM OF SIGMOID COLON: Status: ACTIVE | Noted: 2024-01-31

## 2024-01-31 PROBLEM — D12.2 BENIGN NEOPLASM OF ASCENDING COLON: Status: ACTIVE | Noted: 2024-01-31

## 2024-03-05 ENCOUNTER — LAB ENCOUNTER (OUTPATIENT)
Dept: LAB | Facility: HOSPITAL | Age: 79
End: 2024-03-05
Attending: INTERNAL MEDICINE
Payer: MEDICARE

## 2024-03-05 DIAGNOSIS — E78.2 MIXED HYPERLIPIDEMIA: Primary | ICD-10-CM

## 2024-03-05 LAB
ALT SERPL-CCNC: 25 U/L
AST SERPL-CCNC: 25 U/L (ref 15–37)
CHOLEST SERPL-MCNC: 182 MG/DL (ref ?–200)
FASTING PATIENT LIPID ANSWER: YES
HDLC SERPL-MCNC: 83 MG/DL (ref 40–59)
LDLC SERPL CALC-MCNC: 89 MG/DL (ref ?–100)
NONHDLC SERPL-MCNC: 99 MG/DL (ref ?–130)
TRIGL SERPL-MCNC: 50 MG/DL (ref 30–149)
VLDLC SERPL CALC-MCNC: 8 MG/DL (ref 0–30)

## 2024-03-05 PROCEDURE — 84450 TRANSFERASE (AST) (SGOT): CPT

## 2024-03-05 PROCEDURE — 36415 COLL VENOUS BLD VENIPUNCTURE: CPT

## 2024-03-05 PROCEDURE — 84460 ALANINE AMINO (ALT) (SGPT): CPT

## 2024-03-05 PROCEDURE — 80061 LIPID PANEL: CPT

## 2024-04-30 DIAGNOSIS — N83.201 CYST OF RIGHT OVARY: Primary | ICD-10-CM

## 2024-05-20 ENCOUNTER — HOSPITAL ENCOUNTER (OUTPATIENT)
Dept: ULTRASOUND IMAGING | Age: 79
Discharge: HOME | End: 2024-05-20
Attending: OBSTETRICS & GYNECOLOGY

## 2024-05-20 DIAGNOSIS — N83.201 CYST OF RIGHT OVARY: ICD-10-CM

## 2024-05-20 PROCEDURE — 76856 US EXAM PELVIC COMPLETE: CPT

## 2024-09-06 ENCOUNTER — ORDER TRANSCRIPTION (OUTPATIENT)
Dept: PHYSICAL THERAPY | Facility: HOSPITAL | Age: 79
End: 2024-09-06

## 2024-09-06 DIAGNOSIS — M25.511 ACUTE PAIN OF RIGHT SHOULDER: Primary | ICD-10-CM

## 2024-10-14 ENCOUNTER — OFFICE VISIT (OUTPATIENT)
Dept: PHYSICAL THERAPY | Age: 79
End: 2024-10-14
Attending: INTERNAL MEDICINE
Payer: MEDICARE

## 2024-10-14 DIAGNOSIS — M25.511 ACUTE PAIN OF RIGHT SHOULDER: Primary | ICD-10-CM

## 2024-10-14 PROCEDURE — 97161 PT EVAL LOW COMPLEX 20 MIN: CPT | Performed by: PHYSICAL THERAPIST

## 2024-10-14 PROCEDURE — 97110 THERAPEUTIC EXERCISES: CPT | Performed by: PHYSICAL THERAPIST

## 2024-10-14 NOTE — PROGRESS NOTES
PHYSICAL THERAPY EVALUATION:    Referring Physician: Jojo Galindo    DX Code: Acute pain of right shoulder (M25.511)      PT DX: Acute pain of right shoulder (M25.511)     PCP:     Age: 79 Occupation: retired dentistry teacher   DOI: 2 months  DOS: -         SUBJECTIVE:   HX of Symptoms: 78 y/o female works with  lifting weights, started experiencing R shoulder pain about 2 months ago. Worse: hor add and extension, lying on R side, occasional radiating pain to R elbow.  Relevant PMH: DM2, gastritis, LBP  Precautions:  None  Imaging/Tests: none  .     Pain Summary:  0-5/10     Functional Limitations:   Dressing/Bathing:   Current: difficulty      Prior: not limited  Overhead Activity:   Current: avoiding      Prior: not limited  Disturbed Sleep: Yes: lying on R side  Sleeping Posture:Supine, Sidelying   Leisure Activities: walk, resistance machines, , 4x/week  Work Activities: none  Patient Goals: get back to normal pain free work out    Symptoms aggravated by:    Symptoms relieved by:   Lifting/Carrying At rest      OBJECTIVE:      Postural Summary:  fair        ROM Summary: shoulder flex R: 135, L: 145  abd: R: 150, L: 150,  ER R: 60,  L: 70,  IR BB R: T 9,  L: T7  hor add R: to spine of scapula,  L: to T4         Special Tests Summary: + Speeds and empty can        Flexibility Summary: tight pecs bilaterally          Strength Summary: grossly 5/5, except R elbow flex 4/5, ER 4/5, abd 4-/5, ext 4+/5        Sensation:  intact to light touch  Dural Signs: mildly positive LUTT     Palpation Summary: tender along proximal biceps    Additional Information / Findings:  repeated motions have limited effect on ROM and symptoms  Today’s Treatment:  scap ret, shoulder shrug, median nerve stretch, hor add stretch, shoulder ext stretch, x 12 min total  HEP: Handouts given  Pt. Education: Body mechanics for safe lifting  Body mechanics for safe ADLs  Patient counseled to maintain / resume normal  activities.  Postural Education     Signs and symptoms are consistent with patient’s diagnosis.  Functional impairments include:Disturbed sleep, Difficulty or limited tolerance for ADLs including: lift and reach, and Unable to participate in normal exercise and / or sports activities    Rehab Potential:good  Education or treatment limitation: None    Assessment: Pt presents to physical therapy evaluation with primary c/o R shoulder pain. The results of the objective tests and measures show + biceps and supraspinatus tests.  Functional deficits include but are not limited to lifting and reaching.  Signs and symptoms are consistent with diagnosis of R shoulder pain. Pt and PT discussed evaluation findings, pathology, POC and HEP.  Pt voiced understanding and performs HEP correctly without reported pain. Skilled Physical Therapy is medically necessary to address the above impairments and reach functional goals.     Quickda 18%    Plan of Care:  Treatment will focus on the following goals:       Long term goals to be reached in 8 visits.  Pt. will report decreased pain from 5/10 to 2/10 at worst.  Increase active range of motion of R shoulder flex to 145 degrees.  Increase strength of R shoulder to 4+/5 to allow pt to dress and lift 10# to shoulder height without pain.  LEISURE:  Pt will be able to return to previous level of function for leisure activities  Pt able to don a jacket without pain  Pt. will be independent with home exercise program and self management.    Patient will be seen 2 x /week for 4 weeks or a total of 8 visits.   Treatment will include:   Manual therapy to address joint and/or soft tissue mobility  Therapeutic exercises  Pt. education for posture, body mechanics, and ergonomics  HEP instruction                                                             Pt. was advised regarding the findings of this evaluation and agrees to the plan of care.   Therapeutic Exercise (15 min pam)  [id number]: 12  minutes  Physical Therapy Eval.  [id number]    In agreement with evaluation findings and clinical rationale, this evaluation involved Low Complexity decision making due to 1-2 personal factors/comorbidities, 3 body structures involved/activity limitations, and evolving symptoms including changing pain levels.  Are you being hurt, frightened, demeaned, or taken advantage of by anyone at your home or in your life?  No        Have you recently had thoughts of hurting yourself?  No    Have you tried to hurt yourself in the past?  No

## 2024-10-17 ENCOUNTER — HOSPITAL ENCOUNTER (OUTPATIENT)
Age: 79
Discharge: HOME | End: 2024-10-17
Payer: MEDICARE

## 2024-10-17 ENCOUNTER — APPOINTMENT (OUTPATIENT)
Dept: GENERAL RADIOLOGY | Age: 79
End: 2024-10-17
Attending: NURSE PRACTITIONER
Payer: MEDICARE

## 2024-10-17 VITALS
RESPIRATION RATE: 22 BRPM | OXYGEN SATURATION: 97 % | SYSTOLIC BLOOD PRESSURE: 161 MMHG | HEIGHT: 66 IN | DIASTOLIC BLOOD PRESSURE: 78 MMHG | TEMPERATURE: 99 F | BODY MASS INDEX: 19.29 KG/M2 | HEART RATE: 105 BPM | WEIGHT: 120 LBS

## 2024-10-17 DIAGNOSIS — U07.1 COVID-19 VIRUS INFECTION: Primary | ICD-10-CM

## 2024-10-17 LAB — SARS-COV-2 RNA RESP QL NAA+PROBE: DETECTED

## 2024-10-17 PROCEDURE — 99214 OFFICE O/P EST MOD 30 MIN: CPT

## 2024-10-17 PROCEDURE — 71046 X-RAY EXAM CHEST 2 VIEWS: CPT | Performed by: NURSE PRACTITIONER

## 2024-10-17 RX ORDER — NIRMATRELVIR AND RITONAVIR 300-100 MG
KIT ORAL
Qty: 30 TABLET | Refills: 0 | Status: SHIPPED | OUTPATIENT
Start: 2024-10-17 | End: 2024-10-22

## 2024-10-17 NOTE — DISCHARGE INSTRUCTIONS
Tylenol and Motrin as needed.  If you take Paxlovid you must hold your Simvastatin while taking and for 5 day after taking.

## 2024-10-17 NOTE — ED PROVIDER NOTES
Patient Seen in: [facility]      History     Chief Complaint   Patient presents with    Cough/URI     Stated Complaint: COLD    Subjective:   HPI  79-year-old female with dyslipidemia, arthritis, thyroid nodules, and emphysema with bronchiectasis presents complaining of 3 days of nasal congestion and a cough.  She denies any chest pain or shortness of breath.  She is concerned about her lungs.  She had a low-grade fever up to 99.2.    Objective:     Past Medical History:    Arthritis    Back pain    Chronic rhinitis    Dyslipidemia    Dr Pravin Ibrahim     Flatulence/gas pain/belching    Hemorrhoids    High cholesterol    moderate- patient reported    History of colon polyps    due 2018 Dr Schuster , egd-treated for h pylori     Hyperlipidemia    SEASONAL ALLERGIES    Thyroid nodule    , neg biopsy, Dr Plaza     Wears glasses              Past Surgical History:   Procedure Laterality Date    Appendectomy      Appendectomy      Benign biopsy left      fatty nodule    Colonoscopy      Colonoscopy      Other surgical history      cervical biopsy    Tonsillectomy                  Social History     Socioeconomic History    Marital status:    Tobacco Use    Smoking status: Former     Current packs/day: 0.00     Average packs/day: 1 pack/day for 20.0 years (20.0 ttl pk-yrs)     Types: Cigarettes     Start date:      Quit date:      Years since quittin.8    Smokeless tobacco: Never    Tobacco comments:     quit 30 years ago   Vaping Use    Vaping status: Never Used   Substance and Sexual Activity    Alcohol use: Yes     Alcohol/week: 6.0 standard drinks of alcohol     Types: 6 Glasses of wine per week    Drug use: No    Sexual activity: Yes     Partners: Male   Other Topics Concern    Seat Belt Yes     Social Drivers of Health     Financial Resource Strain: Low Risk  (10/14/2024)    Received from [facility]    Overall Financial Resource Strain (CARDIA)     Difficulty of  Paying Living Expenses: Not hard at all   Food Insecurity: No Food Insecurity (10/14/2024)    Received from [facility] - Food Insecurity     Worried About Running Out of Food in the Last Year: No     Ran Out of Food in the Last Year: No   Transportation Needs: No Transportation Needs (10/14/2024)    Received from [facility] - Transportation     Lack of Transportation: No   Physical Activity: Sufficiently Active (10/14/2024)    Received from [facility]    Exercise Vital Sign     Days of Exercise per Week: 4 days     Minutes of Exercise per Session: 50 min   Stress: Patient Declined (10/14/2024)    Received from [facility]    [place] citizen [place] [employer] - Occupational Stress Questionnaire     Feeling of Stress : Patient declined   Social Connections: Socially Integrated (10/14/2024)    Received from [facility]    Social Connection and Isolation Panel [NHANES]     Frequency of Communication with Friends and Family: More than three times a week     Frequency of Social Gatherings with Friends and Family: More than three times a week     Attends Islam Services: More than 4 times per year     Active Member of Clubs or Organizations: Yes     Attends Club or Organization Meetings: More than 4 times per year     Marital Status:    Housing Stability: Not At Risk (10/14/2024)    Received from [facility] - Housing/Utilities     Has Housing: Yes     Worried About Losing Housing: No     Unable to Get Utilities: No              Review of Systems   All other systems reviewed and are negative.      Positive for stated complaint: COLD  Other systems are as noted in HPI.  Constitutional and vital signs reviewed.      All other systems reviewed and negative except as noted above.    Physical Exam     ED Triage Vitals [10/17/24 0822]   BP (!) 161/78   Pulse 105   Resp (!) 92   Temp 98.8 °F (37.1 °C)   Temp src Temporal   SpO2 97 %    O2 Device None (Room air)       Current Vitals:   Vital Signs  BP: (!) 161/78  Pulse: 105  Resp: 22  Temp: 98.8 °F (37.1 °C)  Temp src: Temporal    Oxygen Therapy  SpO2: 97 %  O2 Device: None (Room air)        Physical Exam  Vitals and nursing note reviewed.   Constitutional:       General: She is not in acute distress.     Appearance: She is well-developed. She is not ill-appearing or toxic-appearing.   HENT:      Right Ear: Tympanic membrane, ear canal and external ear normal.      Left Ear: Tympanic membrane, ear canal and external ear normal.      Nose: Congestion present. No rhinorrhea.      Mouth/Throat:      Pharynx: No oropharyngeal exudate or posterior oropharyngeal erythema.   Cardiovascular:      Rate and Rhythm: Normal rate and regular rhythm.      Heart sounds: Normal heart sounds.   Pulmonary:      Effort: Pulmonary effort is normal.      Comments: Slightly diminished lung sounds to right lower lobe  Skin:     General: Skin is warm and dry.   Neurological:      Mental Status: She is alert and oriented to person, place, and time.             ED Course     Labs Reviewed   RAPID SARS-COV-2 BY PCR - Abnormal; Notable for the following components:       Result Value    Rapid SARS-CoV-2 by PCR Detected (*)     All other components within normal limits          XR CHEST PA + LAT CHEST (CPT=71046)    Result Date: 10/17/2024  PROCEDURE:  XR CHEST PA + LAT CHEST (CPT=71046)  INDICATIONS:  COLD  COMPARISON:  SARAH MOSES, XR CHEST PA + LAT CHEST (CPT=71046), 5/21/2022, 9:34 AM.  TECHNIQUE:  PA and lateral chest radiographs were obtained.  PATIENT STATED HISTORY: (As transcribed by Technologist)  cough and nasal congestion for three days    FINDINGS:  LUNGS:  No focal consolidation.  Normal vascularity. CARDIAC:  Normal size cardiac silhouette. MEDIASTINUM:  Aorta is atherosclerotic. PLEURA:  Normal.  No pleural effusions. BONES:  Normal for age.            CONCLUSION:  There is no evidence of active  cardiopulmonary disease.   LOCATION:  [facility]   Dictated by (CST): Gentry Garza MD on 10/17/2024 at 9:24 AM     Finalized by (CST): Gentry Garza MD on 10/17/2024 at 9:25 AM           [facility]     Medical Decision Making  79-year-old female with dyslipidemia, arthritis, thyroid nodules, and emphysema with bronchiectasis presents complaining of 3 days of nasal congestion and a cough.  She denies any chest pain or shortness of breath.  She is concerned about her lungs.  She had a low-grade fever up to 99.2.    Pertinent Labs & Imaging studies reviewed. (See chart for details).  Patient coming in with viral symptoms.   Differential diagnosis includes but not limited to virus, COVID, bronchitis, pneumonia  Labs reviewed COVID positive. Radiology There is no evidence of active cardiopulmonary disease.  Will treat for COVID.  Will discharge on Tylenol/Motrin and Paxlovid with simvastatin hold. Patient/Parent is comfortable with this plan.    Overall Pt looks good. Non-toxic, well-hydrated and in no respiratory distress. Vital signs are reassuring. Exam is reassuring. I do not believe pt requires and additional diagnostic studies or intervention. I believe pt can be discharged home to continue evaluation as an outpatient. Follow-up provider given. Discharge instructions given and reviewed. Return for any problems. All understand and agree with the plan.        Problems Addressed:  COVID-19 virus infection: acute illness or injury    Amount and/or Complexity of Data Reviewed  Radiology: ordered and independent interpretation performed.     Details: Chest x-ray ordered and independently interpreted by myself as no consolidation        Disposition and Plan     Clinical Impression:  1. COVID-19 virus infection         Disposition:  Discharge  10/17/2024  9:27 am    Follow-up:  No follow-up provider specified.        Medications Prescribed:  Current Discharge Medication List        START taking these medications    Details    nirmatrelvir-ritonavir (PAXLOVID, 300/100,) 300-100 MG Oral Tablet Therapy Pack Take two nirmatrelvir tablets (300mg) with one ritonavir tablet (100mg) together twice daily for 5 days.  Qty: 30 tablet, Refills: 0                 Supplementary Documentation:

## 2024-10-17 NOTE — ED INITIAL ASSESSMENT (HPI)
C/o 2-3 days dry cough with nasal congestion. Fever this morning. Internation travel to [place] 9/22/24-10/2/24. Home kit Covid test negative 2 days ago.

## 2024-10-18 ENCOUNTER — APPOINTMENT (OUTPATIENT)
Dept: PHYSICAL THERAPY | Age: 79
End: 2024-10-18
Attending: INTERNAL MEDICINE
Payer: MEDICARE

## 2024-10-21 ENCOUNTER — APPOINTMENT (OUTPATIENT)
Dept: PHYSICAL THERAPY | Age: 79
End: 2024-10-21
Attending: INTERNAL MEDICINE
Payer: MEDICARE

## 2024-10-25 ENCOUNTER — OFFICE VISIT (OUTPATIENT)
Dept: PHYSICAL THERAPY | Age: 79
End: 2024-10-25
Attending: INTERNAL MEDICINE
Payer: MEDICARE

## 2024-10-25 PROCEDURE — 97140 MANUAL THERAPY 1/> REGIONS: CPT | Performed by: PHYSICAL THERAPIST

## 2024-10-25 PROCEDURE — 97110 THERAPEUTIC EXERCISES: CPT | Performed by: PHYSICAL THERAPIST

## 2024-10-25 NOTE — PROGRESS NOTES
Dx: Acute pain of right shoulder (M25.511)           Authorized # of Visits:  10         Next MD visit: none scheduled  Fall Risk: standard         Precautions: n/a           Medication Changes since last visit?: No  Subjective: doing HEP, R shoulder continues to be bothersome with certain movements, including donning jacket.    Objective: Reviewed HEP. Rx per grid.    Pain mostly with IR BB and ER.    ROM: shoulder flex R: 135, L: 145  abd: R: 150, L: 150,  ER R: 60,  L: 70,  IR BB R: T 9,  L: T7  hor add R: to spine of scapula,  L: to T4    HEP: scap ret, shoulder shrug, median nerve stretch, hor add stretch, shoulder ext stretch  Date: 10/25/2024  Tx#: 2/10 Date:   Tx#: 3/ Date:   Tx#: 4/ Date:   Tx#: 5/ Date:   Tx#: 6/ Date:   Tx#: 7/ Date:   Tx#: 8/   UBE 6 min, alt, L1         Seated pulleys x 3 min         Supine cane press, hor abd, flex x 40 ea                           Man inf glide of R GHJ x 4 min         Man post glide R GHJ x 4 min         STM to R deltoid, pecs, UT x 4 min                               Assessment: good tolerance, symptoms indicative of impingement, possible RC involvement R shoulder.      Goals:   to be reached in 8 visits.  Pt. will report decreased pain from 5/10 to 2/10 at worst.  Increase active range of motion of R shoulder flex to 145 degrees.  Increase strength of R shoulder to 4+/5 to allow pt to dress and lift 10# to shoulder height without pain.  LEISURE:  Pt will be able to return to previous level of function for leisure activities  Pt able to don a jacket without pain  Pt. will be independent with home exercise program and self management.    Plan: cont HEP, add cane flex and abd in supine, assess sx response.    Skilled Services: TE, MT    Charges: TE2, MT1       Total Timed Treatment: TE 32 min, MT 12 min  Total Treatment Time: 44 min

## 2024-10-28 ENCOUNTER — OFFICE VISIT (OUTPATIENT)
Dept: PHYSICAL THERAPY | Age: 79
End: 2024-10-28
Attending: INTERNAL MEDICINE
Payer: MEDICARE

## 2024-10-28 PROCEDURE — 97140 MANUAL THERAPY 1/> REGIONS: CPT | Performed by: PHYSICAL THERAPIST

## 2024-10-28 PROCEDURE — 97110 THERAPEUTIC EXERCISES: CPT | Performed by: PHYSICAL THERAPIST

## 2024-10-28 NOTE — PROGRESS NOTES
Dx: Acute pain of right shoulder (M25.511)           Authorized # of Visits:  10         Next MD visit: none scheduled  Fall Risk: standard         Precautions: n/a           Medication Changes since last visit?: No  Subjective: doing HEP, R shoulder continues to be bothersome with certain movements, including donning jacket. Did more lifting over the weekend, did not get worse.  Currently 3-6/10 R lateral shoulder/upper arm with movement.    Objective: flex ROM improved by 5 degrees. Added neck traction and UT stretching, pt feels this is beneficial.    Pain mostly with IR BB, hor add and ER.    ROM: shoulder flex R: 140, L: 145  abd: R: 150, L: 150,  ER R: 60,  L: 70,  IR BB R: T 9,  L: T7  hor add R: to spine of scapula,  L: to T4    HEP: scap ret, shoulder shrug, median nerve stretch, hor add stretch, shoulder ext stretch  Date: 10/25/2024  Tx#: 2/10 Date: 10/28/24  Tx#: 3/10 Date:   Tx#: 4/ Date:   Tx#: 5/ Date:   Tx#: 6/ Date:   Tx#: 7/ Date:   Tx#: 8/   UBE 6 min, alt, L1 UBE 6 min, alt, L2        Seated pulleys x 3 min Seated pulleys x 3 min         Self hor add x 20, P, NW         Bicep curl 5# 3x10        Supine cane press, hor abd, flex x 40 ea Supine cane press, hor abd, flex x 40 ea                          Man inf glide of R GHJ x 4 min Man inf glide of R GHJ x 4 min        Man post glide R GHJ x 4 min Man post glide R GHJ x 4 min        STM to R deltoid, pecs, UT x 4 min STM to R deltoid, pecs, UT x 4 min         Man c                     Assessment: good tolerance, symptoms indicative of impingement, possible RC involvement R shoulder.      Goals:   to be reached in 8 visits.  Pt. will report decreased pain from 5/10 to 2/10 at worst.  Increase active range of motion of R shoulder flex to 145 degrees.  Increase strength of R shoulder to 4+/5 to allow pt to dress and lift 10# to shoulder height without pain.  LEISURE:  Pt will be able to return to previous level of function for leisure activities  Pt  able to don a jacket without pain  Pt. will be independent with home exercise program and self management.    Plan: cont HEP, including cane flex and abd in supine, assess sx response.    Skilled Services: TE, MT    Charges: TE2, MT1       Total Timed Treatment: TE 32 min, MT 12 min  Total Treatment Time: 44 min

## 2024-11-01 ENCOUNTER — OFFICE VISIT (OUTPATIENT)
Dept: PHYSICAL THERAPY | Age: 79
End: 2024-11-01
Attending: INTERNAL MEDICINE
Payer: MEDICARE

## 2024-11-01 PROCEDURE — 97110 THERAPEUTIC EXERCISES: CPT | Performed by: PHYSICAL THERAPIST

## 2024-11-01 PROCEDURE — 97140 MANUAL THERAPY 1/> REGIONS: CPT | Performed by: PHYSICAL THERAPIST

## 2024-11-01 NOTE — PROGRESS NOTES
Dx: Acute pain of right shoulder (M25.511)           Authorized # of Visits:  10         Next MD visit: none scheduled  Fall Risk: standard         Precautions: n/a           Medication Changes since last visit?: No  Subjective: doing HEP, R shoulder continues to be bothersome with certain movements, including donning jacket. Did not sleep well last night, R shoulder is more painful this am with movement.  Currently 4-7/10 R lateral shoulder/upper arm with movement.    Objective: R shoulder is more sensitive today, we modified biceps resistance. continued neck traction and UT stretching, pt feels this is beneficial.    Pain mostly with IR BB, hor add and ER.    ROM: shoulder flex R: 140, L: 145  abd: R: 150, L: 150,  ER R: 60,  L: 70,  IR BB R: T 9,  L: T7  hor add R: to spine of scapula,  L: to T4    HEP: scap ret, shoulder shrug, median nerve stretch, hor add stretch, shoulder ext stretch  Date: 10/25/2024  Tx#: 2/10 Date: 10/28/24  Tx#: 3/10 Date: 11/1/24  Tx#: 4/10 Date:   Tx#: 5/ Date:   Tx#: 6/ Date:   Tx#: 7/ Date:   Tx#: 8/   UBE 6 min, alt, L1 UBE 6 min, alt, L2 UBE 6 min, alt, L2       Seated pulleys x 3 min Seated pulleys x 3 min Seated pulleys x 3 min         YTB lat pull 3x10        Self hor add x 20, P, NW Self hor add x 20, P, NW        Bicep curl 5# 3x10 Bicep curl 4# 3x10       Supine cane press, hor abd, flex x 40 ea Supine cane press, hor abd, flex x 40 ea Supine cane press, hor abd, flex x 40 ea                         Man inf glide of R GHJ x 4 min Man inf glide of R GHJ x 4 min Man inf glide of R GHJ x 3 min       Man post glide R GHJ x 4 min Man post glide R GHJ x 4 min Man post glide R GHJ x 3 min       STM to R deltoid, pecs, UT x 4 min STM to R deltoid, pecs, UT x 4 min STM to R deltoid, pecs, UT x 4 min        Man c Man c traction, sidebending gr 3 x 5 min         Man UT stretch  x 1 min ea           Assessment: good tolerance, symptoms indicative of impingement, possible RC involvement R  shoulder. No pain post session in neutral.      Goals:   to be reached in 8 visits.  Pt. will report decreased pain from 5/10 to 2/10 at worst.  Increase active range of motion of R shoulder flex to 145 degrees.  Increase strength of R shoulder to 4+/5 to allow pt to dress and lift 10# to shoulder height without pain.  LEISURE:  Pt will be able to return to previous level of function for leisure activities  Pt able to don a jacket without pain  Pt. will be independent with home exercise program and self management.    Plan: cont HEP, including cane flex and abd in supine, assess sx response.    Skilled Services: TE, MT    Charges: TE2, MT1       Total Timed Treatment: TE 32 min, MT 17 min  Total Treatment Time: 49 min

## 2024-11-18 ENCOUNTER — OFFICE VISIT (OUTPATIENT)
Dept: PHYSICAL THERAPY | Age: 79
End: 2024-11-18
Attending: INTERNAL MEDICINE
Payer: MEDICARE

## 2024-11-18 PROCEDURE — 97110 THERAPEUTIC EXERCISES: CPT | Performed by: PHYSICAL THERAPIST

## 2024-11-18 PROCEDURE — 97140 MANUAL THERAPY 1/> REGIONS: CPT | Performed by: PHYSICAL THERAPIST

## 2024-11-18 NOTE — PROGRESS NOTES
Dx: Acute pain of right shoulder (M25.511)           Authorized # of Visits:  10         Next MD visit: none scheduled  Fall Risk: standard         Precautions: n/a           Medication Changes since last visit?: No    Subjective: doing HEP, R shoulder feeling much better, had cortisone injection 11/13/24  Currently 0-1/10 R lateral shoulder/upper arm with movement.    Objective: R shoulder doing well, minor pain with hor abd, hor add, IR BB, ER. Sifg        ROM: shoulder flex R: 150, L: 150  abd: R: 150, L: 150,  ER R: 60,  L: 70,  IR BB R: T 9,  L: T7  hor add R: to spine of scapula,  L: to T4    HEP: scap ret, shoulder shrug, median nerve stretch, hor add stretch, shoulder ext stretch  Date: 10/25/2024  Tx#: 2/10 Date: 10/28/24  Tx#: 3/10 Date: 11/1/24  Tx#: 4/10 Date: 11/18/24  Tx#: 5/10 Date:   Tx#: 6/ Date:   Tx#: 7/ Date:   Tx#: 8/   UBE 6 min, alt, L1 UBE 6 min, alt, L2 UBE 6 min, alt, L2 UBE 6 min, alt, L3      Seated pulleys x 3 min Seated pulleys x 3 min Seated pulleys x 3 min Seated pulleys x 3 min        YTB lat pull 3x10 YTB lat pull 3x10       Self hor add x 20, P, NW Self hor add x 20, P, NW Self hor add x 20, P, NW       Bicep curl 5# 3x10 Bicep curl 4# 3x10 Bicep curl 4# 3x10      Supine cane press, hor abd, flex x 40 ea Supine cane press, hor abd, flex x 40 ea Supine cane press, hor abd, flex x 40 ea Supine 2# press, hor abd, flex x 20 ea                        Man inf glide of R GHJ x 4 min Man inf glide of R GHJ x 4 min Man inf glide of R GHJ x 3 min Man inf glide of R GHJ x 3 min      Man post glide R GHJ x 4 min Man post glide R GHJ x 4 min Man post glide R GHJ x 3 min Man post glide R GHJ x 3 min      STM to R deltoid, pecs, UT x 4 min STM to R deltoid, pecs, UT x 4 min STM to R deltoid, pecs, UT x 4 min STM to R deltoid, pecs, UT x 4 min       Man c Man c traction, sidebending gr 3 x 5 min Man c traction, sidebending gr 3 x 5 min        Man UT stretch  x 1 min ea Man UT stretch  x 1 min ea           Assessment: good tolerance, decreasing symptoms indicative of impingement, possible RC involvement R shoulder.       Goals:   to be reached in 8 visits.  Pt. will report decreased pain from 5/10 to 2/10 at worst.  Increase active range of motion of R shoulder flex to 145 degrees. - MET 11/18/24  Increase strength of R shoulder to 4+/5 to allow pt to dress and lift 10# to shoulder height without pain.  LEISURE:  Pt will be able to return to previous level of function for leisure activities  Pt able to don a jacket without pain  Pt. will be independent with home exercise program and self management.    Plan: tentative discharge next visit if doing well.    Skilled Services: NIRAJ, MT    Charges: TE2, MT1       Total Timed Treatment: TE 32 min, MT 17 min  Total Treatment Time: 49 min

## 2024-11-20 ENCOUNTER — OFFICE VISIT (OUTPATIENT)
Dept: PHYSICAL THERAPY | Age: 79
End: 2024-11-20
Attending: INTERNAL MEDICINE
Payer: MEDICARE

## 2024-11-20 PROCEDURE — 97110 THERAPEUTIC EXERCISES: CPT | Performed by: PHYSICAL THERAPIST

## 2024-11-20 PROCEDURE — 97140 MANUAL THERAPY 1/> REGIONS: CPT | Performed by: PHYSICAL THERAPIST

## 2024-11-20 NOTE — PROGRESS NOTES
Dx: Acute pain of right shoulder (M25.511)           Authorized # of Visits:  10         Next MD visit: none scheduled  Fall Risk: standard         Precautions: n/a           Medication Changes since last visit?: No    Subjective: doing HEP, R shoulder with mild soreness with flex, hor add, IR BB, had cortisone injection 11/13/24  Currently 0-1/10 R lateral shoulder/upper arm with movement.    Objective: R shoulder doing well, minor pain with mid range flexion, hor abd, hor add, IR BB, ER.     IR BB is easier after pulleys for IR.    ROM: shoulder flex R: 150, L: 150  abd: R: 150, L: 150,  ER R: 60,  L: 70,  IR BB R: T 9,  L: T7  hor add R: to spine of scapula,  L: to T4    HEP: scap ret, shoulder shrug, median nerve stretch, hor add stretch, shoulder ext stretch  Date: 10/25/2024  Tx#: 2/10 Date: 10/28/24  Tx#: 3/10 Date: 11/1/24  Tx#: 4/10 Date: 11/18/24  Tx#: 5/10 Date: 11/20/24  Tx#: 6/10 Date:   Tx#: 7/ Date:   Tx#: 8/   UBE 6 min, alt, L1 UBE 6 min, alt, L2 UBE 6 min, alt, L2 UBE 6 min, alt, L3 UBE 6 min, alt, L3     Seated pulleys x 3 min Seated pulleys x 3 min Seated pulleys x 3 min Seated pulleys x 3 min Seated pulleys x 3 min       YTB lat pull 3x10 YTB lat pull 3x10 YTB lat pull 3x10      Self hor add x 20, P, NW Self hor add x 20, P, NW Self hor add x 20, P, NW Self hor add x 20, P, NW      Bicep curl 5# 3x10 Bicep curl 4# 3x10 Bicep curl 4# 3x10 Bicep curl 4# 3x10     Supine cane press, hor abd, flex x 40 ea Supine cane press, hor abd, flex x 40 ea Supine cane press, hor abd, flex x 40 ea Supine 2# press, hor abd, flex x 20 ea Supine 2# press, hor abd, flex x 20 ea                       Man inf glide of R GHJ x 4 min Man inf glide of R GHJ x 4 min Man inf glide of R GHJ x 3 min Man inf glide of R GHJ x 3 min Man inf glide of R GHJ x 2 min     Man post glide R GHJ x 4 min Man post glide R GHJ x 4 min Man post glide R GHJ x 3 min Man post glide R GHJ x 3 min Man post glide R GHJ x 2 min     STM to R  deltoid, pecs, UT x 4 min STM to R deltoid, pecs, UT x 4 min STM to R deltoid, pecs, UT x 4 min STM to R deltoid, pecs, UT x 4 min STM to R deltoid, pecs, UT x 2 min      Man c Man c traction, sidebending gr 3 x 5 min Man c traction, sidebending gr 3 x 5 min Man c traction, sidebending gr 3 x 5 min       Man UT stretch  x 1 min ea Man UT stretch  x 1 min ea Man UT stretch  x 1 min ea         Assessment: good tolerance, minor symptoms indicative of impingement, possible RC involvement R shoulder.       Goals:   to be reached in 8 visits.  Pt. will report decreased pain from 5/10 to 2/10 at worst.  Increase active range of motion of R shoulder flex to 145 degrees. - MET 11/18/24  Increase strength of R shoulder to 4+/5 to allow pt to dress and lift 10# to shoulder height without pain.  LEISURE:  Pt will be able to return to previous level of function for leisure activities  Pt able to don a jacket without pain  Pt. will be independent with home exercise program and self management.    Plan: cont 2 additional visits to progress toward above goals and independent self management.    Skilled Services: TE, MT    Charges: TE2, MT1       Total Timed Treatment: TE 32 min, MT 13 min  Total Treatment Time: 45 min

## 2024-11-26 ENCOUNTER — OFFICE VISIT (OUTPATIENT)
Dept: PHYSICAL THERAPY | Age: 79
End: 2024-11-26
Attending: INTERNAL MEDICINE
Payer: MEDICARE

## 2024-11-26 PROCEDURE — 97110 THERAPEUTIC EXERCISES: CPT | Performed by: PHYSICAL THERAPIST

## 2024-11-26 PROCEDURE — 97140 MANUAL THERAPY 1/> REGIONS: CPT | Performed by: PHYSICAL THERAPIST

## 2024-11-26 NOTE — PROGRESS NOTES
Dx: Acute pain of right shoulder (M25.511)           Authorized # of Visits:  10         Next MD visit: none scheduled  Fall Risk: standard         Precautions: n/a           Medication Changes since last visit?: No    Subjective: doing HEP, R shoulder with mild soreness with flex, hor add, IR BB and elbow flex/ext with abducted arm, had cortisone injection 11/13/24  Currently 0-1/10 R lateral shoulder/upper arm with movement.    Objective: R shoulder doing well, minor pain with elbow flex/ext with arm abducted, hor abd, hor add, IR BB, ER. Elbow flex/ext is pain free post treatment.    IR BB is easier after pulleys for IR.    ROM: shoulder flex R: 150, L: 150  abd: R: 150, L: 150,  ER R: 60,  L: 70,  IR BB R: T 9,  L: T7  hor add R: to spine of scapula,  L: to T4    HEP: scap ret, shoulder shrug, median nerve stretch, hor add stretch, shoulder ext stretch  Date: 10/25/2024  Tx#: 2/10 Date: 10/28/24  Tx#: 3/10 Date: 11/1/24  Tx#: 4/10 Date: 11/18/24  Tx#: 5/10 Date: 11/20/24  Tx#: 6/10 Date: 11/26/24  Tx#: 7/10 Date:   Tx#: 8/   UBE 6 min, alt, L1 UBE 6 min, alt, L2 UBE 6 min, alt, L2 UBE 6 min, alt, L3 UBE 6 min, alt, L3 UBE 6 min, alt, L3    Seated pulleys x 3 min Seated pulleys x 3 min Seated pulleys x 3 min Seated pulleys x 3 min Seated pulleys x 3 min Seated pulleys x 3 min      YTB lat pull 3x10 YTB lat pull 3x10 YTB lat pull 3x10 YTB lat pull 3x10     Self hor add x 20, P, NW Self hor add x 20, P, NW Self hor add x 20, P, NW Self hor add x 20, P, NW Self hor add x 20, P, NW     Bicep curl 5# 3x10 Bicep curl 4# 3x10 Bicep curl 4# 3x10 Bicep curl 4# 3x10 Bicep curl 4# 3x10    Supine cane press, hor abd, flex x 40 ea Supine cane press, hor abd, flex x 40 ea Supine cane press, hor abd, flex x 40 ea Supine 2# press, hor abd, flex x 20 ea Supine 2# press, hor abd, flex x 20 ea Supine 2# press, hor abd, flex x 20 ea                      Man inf glide of R GHJ x 4 min Man inf glide of R GHJ x 4 min Man inf glide of R  GHJ x 3 min Man inf glide of R GHJ x 3 min Man inf glide of R GHJ x 2 min Man inf glide of R GHJ x 2 min    Man post glide R GHJ x 4 min Man post glide R GHJ x 4 min Man post glide R GHJ x 3 min Man post glide R GHJ x 3 min Man post glide R GHJ x 2 min Man post glide R GHJ x 2 min    STM to R deltoid, pecs, UT x 4 min STM to R deltoid, pecs, UT x 4 min STM to R deltoid, pecs, UT x 4 min STM to R deltoid, pecs, UT x 4 min STM to R deltoid, pecs, UT x 2 min STM to R deltoid, pecs, UT x 2 min     Man c Man c traction, sidebending gr 3 x 5 min Man c traction, sidebending gr 3 x 5 min Man c traction, sidebending gr 3 x 5 min Man c traction, sidebending gr 3 x 5 min      Man UT stretch  x 1 min ea Man UT stretch  x 1 min ea Man UT stretch  x 1 min ea Man UT stretch  x 1 min ea        Assessment: responds to treatment with decreased symptoms post session, minor symptoms indicative of impingement, possible RC involvement R shoulder.       Goals:   to be reached in 8 visits.  Pt. will report decreased pain from 5/10 to 2/10 at worst.  Increase active range of motion of R shoulder flex to 145 degrees. - MET 11/18/24  Increase strength of R shoulder to 4+/5 to allow pt to dress and lift 10# to shoulder height without pain.  LEISURE:  Pt will be able to return to previous level of function for leisure activities  Pt able to don a jacket without pain  Pt. will be independent with home exercise program and self management.    Plan: cont 3 additional visits to progress toward above goals and independent self management.    Skilled Services: TE, MT    Charges: TE2, MT1       Total Timed Treatment: TE 33 min, MT 13 min  Total Treatment Time: 46 min

## 2024-12-03 ENCOUNTER — OFFICE VISIT (OUTPATIENT)
Dept: PHYSICAL THERAPY | Age: 79
End: 2024-12-03
Attending: INTERNAL MEDICINE
Payer: MEDICARE

## 2024-12-03 PROCEDURE — 97110 THERAPEUTIC EXERCISES: CPT | Performed by: PHYSICAL THERAPIST

## 2024-12-03 PROCEDURE — 97140 MANUAL THERAPY 1/> REGIONS: CPT | Performed by: PHYSICAL THERAPIST

## 2024-12-03 NOTE — PROGRESS NOTES
Dx: Acute pain of right shoulder (M25.511)           Authorized # of Visits:  10         Next MD visit: none scheduled  Fall Risk: standard         Precautions: n/a           Medication Changes since last visit?: No    Subjective: doing HEP, R shoulder with mild soreness with flex, hor add, IR BB and elbow flex/ext with abducted arm, has to don jacket with R arm first.   had cortisone injection 11/13/24  Currently 0-1/10 R lateral shoulder/upper arm with hor add.    Objective: R shoulder doing well, minor pain with elbow flex/ext with arm abducted, hor abd, hor add, IR BB, ER. Elbow flex/ext is pain free post treatment.  Progressed supine resistance ex.    B UT's are tight, feel looser after man techniques.  IR BB is easier after pulleys for IR.    ROM: shoulder flex R: 150, L: 150  abd: R: 150, L: 150,  ER R: 60,  L: 70,  IR BB R: T 9,  L: T7  hor add R: to spine of scapula,  L: to T4      HEP: scap ret, shoulder shrug, median nerve stretch, hor add stretch, shoulder ext stretch  Date: 10/25/2024  Tx#: 2/10 Date: 10/28/24  Tx#: 3/10 Date: 11/1/24  Tx#: 4/10 Date: 11/18/24  Tx#: 5/10 Date: 11/20/24  Tx#: 6/10 Date: 11/26/24  Tx#: 7/10 Date: 12/3/24  Tx#: 8/10   UBE 6 min, alt, L1 UBE 6 min, alt, L2 UBE 6 min, alt, L2 UBE 6 min, alt, L3 UBE 6 min, alt, L3 UBE 6 min, alt, L3 UBE 6 min, alt, L3   Seated pulleys x 3 min Seated pulleys x 3 min Seated pulleys x 3 min Seated pulleys x 3 min Seated pulleys x 3 min Seated pulleys x 3 min Seated pulleys x 3 min     YTB lat pull 3x10 YTB lat pull 3x10 YTB lat pull 3x10 YTB lat pull 3x10 YTB lat pull 3x10    Self hor add x 20, P, NW Self hor add x 20, P, NW Self hor add x 20, P, NW Self hor add x 20, P, NW Self hor add x 20, P, NW Self hor add x 20, P, NW    Bicep curl 5# 3x10 Bicep curl 4# 3x10 Bicep curl 4# 3x10 Bicep curl 4# 3x10 Bicep curl 4# 3x10 Bicep curl 4# 3x10   Supine cane press, hor abd, flex x 40 ea Supine cane press, hor abd, flex x 40 ea Supine cane press, hor  abd, flex x 40 ea Supine 2# press, hor abd, flex x 20 ea Supine 2# press, hor abd, flex x 20 ea Supine 2# press, hor abd, flex x 20 ea Supine 2# press, hor abd, flex x 30 ea                     Man inf glide of R GHJ x 4 min Man inf glide of R GHJ x 4 min Man inf glide of R GHJ x 3 min Man inf glide of R GHJ x 3 min Man inf glide of R GHJ x 2 min Man inf glide of R GHJ x 2 min Man inf glide of R GHJ x 2 min   Man post glide R GHJ x 4 min Man post glide R GHJ x 4 min Man post glide R GHJ x 3 min Man post glide R GHJ x 3 min Man post glide R GHJ x 2 min Man post glide R GHJ x 2 min Man post glide R GHJ x 2 min   STM to R deltoid, pecs, UT x 4 min STM to R deltoid, pecs, UT x 4 min STM to R deltoid, pecs, UT x 4 min STM to R deltoid, pecs, UT x 4 min STM to R deltoid, pecs, UT x 2 min STM to R deltoid, pecs, UT x 2 min STM to R deltoid, pecs, UT x 2 min    Man c Man c traction, sidebending gr 3 x 5 min Man c traction, sidebending gr 3 x 5 min Man c traction, sidebending gr 3 x 5 min Man c traction, sidebending gr 3 x 5 min Man c traction, sidebending gr 3 x 5 min     Man UT stretch  x 1 min ea Man UT stretch  x 1 min ea Man UT stretch  x 1 min ea Man UT stretch  x 1 min ea Man UT stretch  x 1 min ea       Assessment: responds to treatment with decreased symptoms post session, minor symptoms indicative of impingement, possible RC involvement R shoulder.       Goals:   to be reached in 8 visits.  Pt. will report decreased pain from 5/10 to 2/10 at worst.  Increase active range of motion of R shoulder flex to 145 degrees. - MET 11/18/24  Increase strength of R shoulder to 4+/5 to allow pt to dress and lift 10# to shoulder height without pain.  LEISURE:  Pt will be able to return to previous level of function for leisure activities  Pt able to don a jacket without pain  Pt. will be independent with home exercise program and self management.    Plan: cont 2 additional visits to progress toward above goals and independent  self management.    Skilled Services: TE, MT    Charges: TE2, MT1       Total Timed Treatment: TE 33 min, MT 13 min  Total Treatment Time: 46 min

## 2024-12-20 ENCOUNTER — OFFICE VISIT (OUTPATIENT)
Dept: PHYSICAL THERAPY | Age: 79
End: 2024-12-20
Attending: INTERNAL MEDICINE
Payer: MEDICARE

## 2024-12-20 PROCEDURE — 97140 MANUAL THERAPY 1/> REGIONS: CPT | Performed by: PHYSICAL THERAPIST

## 2024-12-20 PROCEDURE — 97110 THERAPEUTIC EXERCISES: CPT | Performed by: PHYSICAL THERAPIST

## 2024-12-20 NOTE — PROGRESS NOTES
Dx: Acute pain of right shoulder (M25.511)           Authorized # of Visits:  10         Next MD visit: none scheduled  Fall Risk: standard         Precautions: n/a           Medication Changes since last visit?: No    Subjective: overall feeling better, though R shoulder continues with mild soreness with flex, hor add, IR BB and elbow flex/ext with abducted arm, has to don jacket with R arm first.   had cortisone injection 11/13/24  Currently 0-1/10 R lateral shoulder/upper arm with hor add.    Objective: R shoulder doing well, minor pain with elbow flex/ext with arm abducted, hor abd, hor add, IR BB, ER. Elbow flex/ext is pain free post treatment.  Progressed supine resistance ex, pt has difficulty with supine flyes using 2#, discontinued and transitioned to gravity only flyes.    B UT's are tight, decreased tension after man techniques.  IR BB is easier after pulleys for IR.    MMT: 4-4+/5 R shoulder, depending on amount of resistance there is some pain elicited.    ROM: shoulder flex R: 150, L: 150  abd: R: 150, L: 150,  ER R: 60,  L: 70,  IR BB R: T 9,  L: T7  hor add R: improved to T2,  L: to T4      HEP: scap ret, shoulder shrug, median nerve stretch, hor add stretch, shoulder ext stretch  Date: 10/25/2024  Tx#: 2/10 Date: 10/28/24  Tx#: 3/10 Date: 11/1/24  Tx#: 4/10 Date: 11/18/24  Tx#: 5/10 Date: 11/20/24  Tx#: 6/10 Date: 11/26/24  Tx#: 7/10 Date: 12/3/24  Tx#: 8/10 Date: 12/20/24  Tx#: 9/10   UBE 6 min, alt, L1 UBE 6 min, alt, L2 UBE 6 min, alt, L2 UBE 6 min, alt, L3 UBE 6 min, alt, L3 UBE 6 min, alt, L3 UBE 6 min, alt, L3 UBE 6 min, alt, L3   Seated pulleys x 3 min Seated pulleys x 3 min Seated pulleys x 3 min Seated pulleys x 3 min Seated pulleys x 3 min Seated pulleys x 3 min Seated pulleys x 3 min Seated pulleys x 3 min     YTB lat pull 3x10 YTB lat pull 3x10 YTB lat pull 3x10 YTB lat pull 3x10 YTB lat pull 3x10 YTB lat pull 3x10    Self hor add x 20, P, NW Self hor add x 20, P, NW Self hor add x 20,  P, NW Self hor add x 20, P, NW Self hor add x 20, P, NW Self hor add x 20, P, NW Self hor add x 20, P, NW    Bicep curl 5# 3x10 Bicep curl 4# 3x10 Bicep curl 4# 3x10 Bicep curl 4# 3x10 Bicep curl 4# 3x10 Bicep curl 4# 3x10 Bicep curl 4# 3x10   Supine cane press, hor abd, flex x 40 ea Supine cane press, hor abd, flex x 40 ea Supine cane press, hor abd, flex x 40 ea Supine 2# press, hor abd, flex x 20 ea Supine 2# press, hor abd, flex x 20 ea Supine 2# press, hor abd, flex x 20 ea Supine 2# press, hor abd, flex x 30 ea Supine 2# press, hor abd, flex x 30 ea          Supine flyes 0# x 30             Man inf glide of R GHJ x 4 min Man inf glide of R GHJ x 4 min Man inf glide of R GHJ x 3 min Man inf glide of R GHJ x 3 min Man inf glide of R GHJ x 2 min Man inf glide of R GHJ x 2 min Man inf glide of R GHJ x 2 min Man inf glide of R GHJ x 2 min   Man post glide R GHJ x 4 min Man post glide R GHJ x 4 min Man post glide R GHJ x 3 min Man post glide R GHJ x 3 min Man post glide R GHJ x 2 min Man post glide R GHJ x 2 min Man post glide R GHJ x 2 min Man post glide R GHJ x 2 min   STM to R deltoid, pecs, UT x 4 min STM to R deltoid, pecs, UT x 4 min STM to R deltoid, pecs, UT x 4 min STM to R deltoid, pecs, UT x 4 min STM to R deltoid, pecs, UT x 2 min STM to R deltoid, pecs, UT x 2 min STM to R deltoid, pecs, UT x 2 min STM to R deltoid, pecs, UT x 2 min    Man c Man c traction, sidebending gr 3 x 5 min Man c traction, sidebending gr 3 x 5 min Man c traction, sidebending gr 3 x 5 min Man c traction, sidebending gr 3 x 5 min Man c traction, sidebending gr 3 x 5 min Man c traction, sidebending gr 3 x 8 min     Man UT stretch  x 1 min ea Man UT stretch  x 1 min ea Man UT stretch  x 1 min ea Man UT stretch  x 1 min ea Man UT stretch  x 1 min ea Man UT stretch  x 1 min ea       Assessment: pt progressing functionally, continues with minor symptoms indicative of impingement, possible RC involvement R shoulder with continued  weakness and some pain with movements as above.       Goals:   to be reached in 8 visits.  Pt. will report decreased pain from 5/10 to 2/10 at worst.- mostly met 12/20/24  Increase active range of motion of R shoulder flex to 145 degrees. - MET 11/18/24  Increase strength of R shoulder to 4+/5 to allow pt to dress and lift 10# to shoulder height without pain.  LEISURE:  Pt will be able to return to previous level of function for leisure activities  Pt able to don a jacket without pain- met, if putting on R sleeve first.  Pt. will be independent with home exercise program and self management.    Plan: pt traveling for 10 days, tentative discharge next visit if doing well upon return.    Skilled Services: NIRAJ, MT    Charges: TE3, MT1       Total Timed Treatment: TE 38 min, MT 16 min  Total Treatment Time: 54 min

## 2024-12-30 ENCOUNTER — APPOINTMENT (OUTPATIENT)
Dept: PHYSICAL THERAPY | Age: 79
End: 2024-12-30
Attending: INTERNAL MEDICINE
Payer: MEDICARE

## 2025-01-15 ENCOUNTER — OFFICE VISIT (OUTPATIENT)
Dept: PHYSICAL THERAPY | Age: 80
End: 2025-01-15
Attending: INTERNAL MEDICINE
Payer: MEDICARE

## 2025-01-15 PROCEDURE — 97140 MANUAL THERAPY 1/> REGIONS: CPT | Performed by: PHYSICAL THERAPIST

## 2025-01-15 PROCEDURE — 97110 THERAPEUTIC EXERCISES: CPT | Performed by: PHYSICAL THERAPIST

## 2025-01-15 NOTE — PROGRESS NOTES
DISCHARGE NOTE  Dx: Acute pain of right shoulder (M25.511)           Authorized # of Visits:  10         Next MD visit: none scheduled  Fall Risk: standard         Precautions: n/a           Medication Changes since last visit?: No    Subjective: continues feeling better, though R shoulder continues with mild soreness with flex, hor add, IR BB and elbow flex/ext with abducted arm.    Currently 0-1/10 R lateral shoulder/upper arm with hor add.    Objective: R shoulder doing well, minor pain with elbow flex/ext with arm abducted, hor abd, hor add, IR BB, ER. Elbow flex/ext is pain free. Progressed supine resistance ex.    B UT's are tight, decreased tension after man techniques.  IR BB is easier after pulleys for IR.    MMT: 4+/5 R shoulder flex, abd,   5/5 elbow ext and shoulder ER.    ROM: shoulder flex R: 150, L: 150  abd: R: 150, L: 150,  ER R: 70,  L: 75,  IR BB R: T 9,  L: T7  hor add R: improved to T2,  L: to T4      HEP: scap ret, shoulder shrug, median nerve stretch, hor add stretch, shoulder ext stretch  Date: 10/25/2024  Tx#: 2/10 Date: 10/28/24  Tx#: 3/10 Date: 11/1/24  Tx#: 4/10 Date: 11/18/24  Tx#: 5/10 Date: 11/20/24  Tx#: 6/10 Date: 11/26/24  Tx#: 7/10 Date: 12/3/24  Tx#: 8/10 Date: 12/20/24  Tx#: 9/10 Date: 1/15/25  Tx#: 10/10   UBE 6 min, alt, L1 UBE 6 min, alt, L2 UBE 6 min, alt, L2 UBE 6 min, alt, L3 UBE 6 min, alt, L3 UBE 6 min, alt, L3 UBE 6 min, alt, L3 UBE 6 min, alt, L3 UBE 6 min, alt, L3   Seated pulleys x 3 min Seated pulleys x 3 min Seated pulleys x 3 min Seated pulleys x 3 min Seated pulleys x 3 min Seated pulleys x 3 min Seated pulleys x 3 min Seated pulleys x 3 min Seated pulleys x 3 min     YTB lat pull 3x10 YTB lat pull 3x10 YTB lat pull 3x10 YTB lat pull 3x10 YTB lat pull 3x10 YTB lat pull 3x10       Self hor add x 20, P, NW Self hor add x 20, P, NW Self hor add x 20, P, NW Self hor add x 20, P, NW Self hor add x 20, P, NW Self hor add x 20, P, NW Self hor add x 20, P, NW Self hor add  x 20, P, NW    Bicep curl 5# 3x10 Bicep curl 4# 3x10 Bicep curl 4# 3x10 Bicep curl 4# 3x10 Bicep curl 4# 3x10 Bicep curl 4# 3x10 Bicep curl 4# 3x10    Supine cane press, hor abd, flex x 40 ea Supine cane press, hor abd, flex x 40 ea Supine cane press, hor abd, flex x 40 ea Supine 2# press, hor abd, flex x 20 ea Supine 2# press, hor abd, flex x 20 ea Supine 2# press, hor abd, flex x 20 ea Supine 2# press, hor abd, flex x 30 ea Supine 2# press, hor abd, flex x 30 ea Supine 4# press x 30 ea          Supine flyes 0# x 30 Supine flyes 0# x 30              Man inf glide of R GHJ x 4 min Man inf glide of R GHJ x 4 min Man inf glide of R GHJ x 3 min Man inf glide of R GHJ x 3 min Man inf glide of R GHJ x 2 min Man inf glide of R GHJ x 2 min Man inf glide of R GHJ x 2 min Man inf glide of R GHJ x 2 min Man inf glide of R GHJ x 2 min   Man post glide R GHJ x 4 min Man post glide R GHJ x 4 min Man post glide R GHJ x 3 min Man post glide R GHJ x 3 min Man post glide R GHJ x 2 min Man post glide R GHJ x 2 min Man post glide R GHJ x 2 min Man post glide R GHJ x 2 min Man post glide R GHJ x 2 min   STM to R deltoid, pecs, UT x 4 min STM to R deltoid, pecs, UT x 4 min STM to R deltoid, pecs, UT x 4 min STM to R deltoid, pecs, UT x 4 min STM to R deltoid, pecs, UT x 2 min STM to R deltoid, pecs, UT x 2 min STM to R deltoid, pecs, UT x 2 min STM to R deltoid, pecs, UT x 2 min STM to R deltoid, pecs, UT x 2 min    Man c Man c traction, sidebending gr 3 x 5 min Man c traction, sidebending gr 3 x 5 min Man c traction, sidebending gr 3 x 5 min Man c traction, sidebending gr 3 x 5 min Man c traction, sidebending gr 3 x 5 min Man c traction, sidebending gr 3 x 8 min Man c traction, sidebending gr 3 x 8 min     Man UT stretch  x 1 min ea Man UT stretch  x 1 min ea Man UT stretch  x 1 min ea Man UT stretch  x 1 min ea Man UT stretch  x 1 min ea Man UT stretch  x 1 min ea Man UT stretch  x 1 min ea       Assessment: pt doing well with self  management, has minimal discomfort with most activities.     Quickdash post: 5%      Goals:   to be reached in 8 visits.  Pt. will report decreased pain from 5/10 to 2/10 at worst.- mostly met 12/20/24  Increase active range of motion of R shoulder flex to 145 degrees. - MET 11/18/24  Increase strength of R shoulder to 4+/5 to allow pt to dress and lift 10# to shoulder height without pain.- progressing 1/15/25  LEISURE:  Pt will be able to return to previous level of function for leisure activities- moslty met 1/15/25  Pt able to don a jacket without pain- met, if putting on R sleeve first.  Pt. will be independent with home exercise program and self management.- MET 1/15/25    Plan: discharge to [facility].    Skilled Services: SARANYA HEALY    Charges: TE2, MT1       Total Timed Treatment: TE 32 min, MT 16 min  Total Treatment Time: 48 min

## 2025-02-24 ENCOUNTER — TELEPHONE (OUTPATIENT)
Dept: PHYSICAL THERAPY | Facility: HOSPITAL | Age: 80
End: 2025-02-24

## 2025-03-11 ENCOUNTER — HOSPITAL ENCOUNTER (OUTPATIENT)
Age: 80
Discharge: HOME | End: 2025-03-11
Payer: MEDICARE

## 2025-03-11 VITALS
SYSTOLIC BLOOD PRESSURE: 133 MMHG | BODY MASS INDEX: 19.29 KG/M2 | DIASTOLIC BLOOD PRESSURE: 68 MMHG | WEIGHT: 120 LBS | HEART RATE: 83 BPM | RESPIRATION RATE: 20 BRPM | TEMPERATURE: 98 F | OXYGEN SATURATION: 98 % | HEIGHT: 66 IN

## 2025-03-11 DIAGNOSIS — J10.1 INFLUENZA B: Primary | ICD-10-CM

## 2025-03-11 LAB
POCT INFLUENZA A: NEGATIVE
POCT INFLUENZA B: POSITIVE

## 2025-03-11 PROCEDURE — 99213 OFFICE O/P EST LOW 20 MIN: CPT

## 2025-03-11 PROCEDURE — 87502 INFLUENZA DNA AMP PROBE: CPT | Performed by: NURSE PRACTITIONER

## 2025-03-11 PROCEDURE — 99212 OFFICE O/P EST SF 10 MIN: CPT

## 2025-03-11 NOTE — DISCHARGE INSTRUCTIONS
- Tylenol as directed for fever.   - Flonase: 2 sprays to each nostril in the AM.  - Rest and push fluids (consider Gatorade or Pedialyte too)  - Hot lemon tea with honey  - Steam twice a day (either in shower or with cup of hot water and a towel over your head)     Please follow up with your primary care doctor in 7 days if not improving.

## 2025-03-11 NOTE — ED INITIAL ASSESSMENT (HPI)
Presents for nasal congestion and fatigue for last several days. Denies cough, no nausea, vomiting, diarrhea. Covid test at home last night was negative. Concerned for RSV.

## 2025-03-14 NOTE — ED PROVIDER NOTES
Patient Seen in: [facility]      History     Chief Complaint   Patient presents with    Nasal Congestion     Stated Complaint: Fever    Subjective:   81 yo female presents with complaint of nasal congestion, body aches, fatigue that began about 4 days ago.   Pt concerned for RSV, has no cough. No exposure to RSV.   Home Covid test negative.   Pt denies cough, fever, chills, shortness of breath, chest pain, nausea, vomiting, diarrhea.     The history is provided by the patient.         Objective:     Past Medical History:    Arthritis    Back pain    Chronic rhinitis    Dyslipidemia    Dr Pravin Ibrahim     Flatulence/gas pain/belching    Hemorrhoids    High cholesterol    moderate- patient reported    History of colon polyps    due  Dr Schuster , egd-treated for h pylori     Hyperlipidemia    SEASONAL ALLERGIES    Thyroid nodule    , neg biopsy, Dr Plaza     Wears glasses              Past Surgical History:   Procedure Laterality Date    Appendectomy      Appendectomy      Benign biopsy left      fatty nodule    Colonoscopy      Colonoscopy      Other surgical history      cervical biopsy    Tonsillectomy                  Social History     Socioeconomic History    Marital status:    Tobacco Use    Smoking status: Former     Current packs/day: 0.00     Average packs/day: 1 pack/day for 20.0 years (20.0 ttl pk-yrs)     Types: Cigarettes     Start date:      Quit date:      Years since quittin.2    Smokeless tobacco: Never    Tobacco comments:     quit 30 years ago   Vaping Use    Vaping status: Never Used   Substance and Sexual Activity    Alcohol use: Yes     Alcohol/week: 6.0 standard drinks of alcohol     Types: 6 Glasses of wine per week    Drug use: No    Sexual activity: Yes     Partners: Male   Other Topics Concern    Seat Belt Yes     Social Drivers of Health     Food Insecurity: No Food Insecurity (10/14/2024)    Received from [facility] - Food  Insecurity     Worried About Running Out of Food in the Last Year: No     Ran Out of Food in the Last Year: No   Transportation Needs: No Transportation Needs (10/14/2024)    Received from [facility] - Transportation     Lack of Transportation: No   Housing Stability: Not At Risk (10/14/2024)    Received from [facility] - Housing/Utilities     Has Housing: Yes     Worried About Losing Housing: No     Unable to Get Utilities: No              Review of Systems   Constitutional:  Negative for chills and fever.   HENT:  Positive for congestion. Negative for trouble swallowing.    Respiratory:  Negative for cough and shortness of breath.    Cardiovascular:  Negative for chest pain.   Gastrointestinal:  Negative for abdominal pain, nausea and vomiting.       Positive for stated complaint: Fever  Other systems are as noted in HPI.  Constitutional and vital signs reviewed.      All other systems reviewed and negative except as noted above.    Physical Exam     ED Triage Vitals [03/11/25 0859]   /68   Pulse 83   Resp 20   Temp 97.7 °F (36.5 °C)   Temp src Oral   SpO2 98 %   O2 Device        Current Vitals:   No data recorded    Physical Exam  Vitals and nursing note reviewed.   Constitutional:       General: She is not in acute distress.     Appearance: Normal appearance. She is not ill-appearing, toxic-appearing or diaphoretic.   HENT:      Head: Normocephalic.      Right Ear: Tympanic membrane normal.      Left Ear: Tympanic membrane normal.      Nose: Rhinorrhea present.      Mouth/Throat:      Mouth: Mucous membranes are moist.      Pharynx: Oropharynx is clear.   Eyes:      Conjunctiva/sclera: Conjunctivae normal.   Cardiovascular:      Rate and Rhythm: Normal rate and regular rhythm.   Pulmonary:      Effort: Pulmonary effort is normal. No respiratory distress.      Breath sounds: Normal breath sounds. No stridor. No wheezing, rhonchi or rales.   Musculoskeletal:      Cervical  back: Normal range of motion and neck supple.   Lymphadenopathy:      Cervical: No cervical adenopathy.   Skin:     General: Skin is warm and dry.   Neurological:      Mental Status: She is alert.   Psychiatric:         Mood and Affect: Mood normal.             ED Course     Labs Reviewed   POCT FLU TEST - Abnormal; Notable for the following components:       Result Value    POCT INFLUENZA B Positive (*)     All other components within normal limits    Narrative:     This assay is a rapid molecular in vitro test utilizing nucleic acid amplification of influenza A and B viral RNA.      MDM      Medical Decision Making  81 yo female presents with complaint of nasal congestion, body aches, fatigue that began about 4 days ago.  Diff dx include Influenza, Covid, viral uri.   On exam, pt is well appearing with normal vitals. Lungs clear bilaterally.   Influenza B is positive.   Supportive care discussed; see AVS.   Follow up with PCP if not improving as anticipated in the next week.   Pt agreeable to plan.     Amount and/or Complexity of Data Reviewed  External Data Reviewed: notes.  Labs: ordered.    Risk  OTC drugs.        Disposition and Plan     Clinical Impression:  1. Influenza B         Disposition:  Discharge  3/11/2025  9:18 am    Follow-up:  Jojo Galindo MD  [address] [place] [address]  [address]  [facility] [id number] [phone]    In 5 days  If symptoms worsen          Medications Prescribed:  Discharge Medication List as of 3/11/2025  9:20 AM              Supplementary Documentation:

## 2025-03-26 ENCOUNTER — APPOINTMENT (OUTPATIENT)
Dept: PHYSICAL THERAPY | Age: 80
End: 2025-03-26
Attending: INTERNAL MEDICINE
Payer: MEDICARE

## 2025-03-31 ENCOUNTER — OFFICE VISIT (OUTPATIENT)
Dept: PHYSICAL THERAPY | Age: 80
End: 2025-03-31
Attending: INTERNAL MEDICINE
Payer: MEDICARE

## 2025-03-31 PROCEDURE — 97161 PT EVAL LOW COMPLEX 20 MIN: CPT | Performed by: PHYSICAL THERAPIST

## 2025-03-31 PROCEDURE — 97110 THERAPEUTIC EXERCISES: CPT | Performed by: PHYSICAL THERAPIST

## 2025-03-31 NOTE — PROGRESS NOTES
UPPER EXTREMITY EVALUATION:     Diagnosis:   R RC tear Patient:  Renetta Devine (80 year old, female)        Referring Provider: Jojo Galindo  Today's Date   3/31/2025    Precautions:      Date of Evaluation: 03/31/25  Next MD visit: April  Date of Surgery: none     PATIENT SUMMARY   Summary of chief complaints: R shoulder weakness, some pain  History of current condition: insidious onset R shoulder weakness and pain late 2024   Pain level: current 1 /10, at best 0 /10, at worst 4 /10  Description of symptoms: R shoulder fatigue   Occupation: retired   Leisure activities/Hobbies: walking, weights with    Prior level of function: active, regular work outs  Current limitations: limited lifting  Pt goals: getting back to full function  Hand Dominance:     Past medical history was reviewed with Renetta.  Significant findings include:    Imaging/Tests: MRI - full thickness supraspinatus   Renetta  has a past medical history of Arthritis, Back pain, Chronic rhinitis, Dyslipidemia, Flatulence/gas pain/belching, Hemorrhoids, High cholesterol, History of colon polyps, Hyperlipidemia, SEASONAL ALLERGIES, Thyroid nodule, and Wears glasses.  She  has a past surgical history that includes appendectomy; tonsillectomy; other surgical history; colonoscopy; appendectomy; benign biopsy left; and colonoscopy.    ASSESSMENT  Renetta presents to physical therapy evaluation with primary c/o R shoulder weakness, some pain. The results of the objective tests and measures show R shoulder weakness with abd, ER, elbow flex. Functional deficits include but are not limited to limited lifting. Signs and symptoms are consistent with diagnosis of R RC tear. Pt and PT discussed evaluation findings, pathology, POC and HEP.  Pt voiced understanding and performs HEP correctly without reported pain. Skilled Physical Therapy is medically necessary to address the above impairments and reach functional goals.  OBJECTIVE:     Musculoskeletal  Observation/Posture:    Palpation: min tenderness R supraspinatus   Accessory motion:      Special Tests: + empty can   + Speed's test     ROM and Strength (* denotes performed with pain)  Shoulder   ROM MMT (-/5)    R L R L     Flex 150 160 4 5     Ext 40 40 5 5     Abd (C5) 140 150 4 5     IR T10 T7 4+ 5     ER 60 70 4- 5     Low Trap n/a         Mid Trap n/a         SA n/a         ,   Elbow   ROM MMT (-/5)    R L R L     Flex (C6) full full 4+ 5     Ext (C7) full full 5 5     Pronation             Supination                 Neurological:  Sensation: intact to light touch      Today's Treatment and Response:   Pt education was provided on exam findings, treatment diagnosis, treatment plan, expectations, and prognosis.  Today's Treatment       3/31/2025   UE Treatment   Therapeutic Exercise Pulleys flex x 4 min  Bicep curl 4# 3x10  YTB triceps press 3x10   Therapeutic Exercise Minutes 12   Evaluation Minutes 32   Total Time Of Timed Procedures 12   Total Time Of Service-Based Procedures 32   Total Treatment Time 44   HEP AROM shoulder flex/abd, biceps and triceps        Patient was instructed in and issued a HEP for: AROM shoulder flex/abd, biceps and triceps    Charges:  PT EVAL: Low Complexity, TE1  In agreement with evaluation findings and clinical rationale, this evaluation involved LOW COMPLEXITY decision making due to no personal factors/comorbidities, 1-2 body structures involved/activity limitations, and stable symptoms as documented in the evaluation.                                                          PLAN OF CARE:    Goals: (to be met in 8 visits)     Long term goals to be reached in 8 visits.  Pt. will report decreased pain from 4/10 to 2/10 at worst.  Increase active range of motion of R shoulder flex  to 160 degrees to allow reaching into kitchen cabinets.    Increase strength of R shoulder flex, abd to 4+/5 to allow pt to lift 4# above shoulder height.  LEISURE:  Pt will be able  to return to previous level of function for leisure activities  Pt able to return to regular resistance training at gym without pain   Pt. will be independent with home exercise program and self management.      Frequency / Duration: Patient will be seen 1-2x/week or a total of 8  visits over a 90 day period. Treatment will include: Manual Therapy; Therapeutic Exercise; Home Exercise Program instruction    Education or treatment limitation: None   Rehab Potential: excellent     QuickDASH Outcome Score  Score: (Patient-Rptd) 18.18 % (10/12/2024  9:14 PM)      Patient/Family/Caregiver was advised of these findings, precautions, and treatment options and has agreed to actively participate in planning and for this course of care.    Thank you for your referral. Please co-sign or sign and return this letter via fax as soon as possible to [phone]. If you have any questions, please contact me at Dept: [phone]    Sincerely,  Electronically signed by therapist: Stanford Brannon, PT, DPT, OCS, Cert MDT   Physician's certification required: Yes  I certify the need for these services furnished under this plan of treatment and while under my care.    X___________________________________________________ Date____________________    Certification From: 3/31/2025  To:6/29/2025

## 2025-04-01 ENCOUNTER — ORDER TRANSCRIPTION (OUTPATIENT)
Dept: PHYSICAL THERAPY | Facility: HOSPITAL | Age: 80
End: 2025-04-01

## 2025-04-01 DIAGNOSIS — M75.101 ROTATOR CUFF SYNDROME OF RIGHT SHOULDER: Primary | ICD-10-CM

## 2025-04-02 ENCOUNTER — OFFICE VISIT (OUTPATIENT)
Dept: PHYSICAL THERAPY | Age: 80
End: 2025-04-02
Attending: ORTHOPAEDIC SURGERY
Payer: MEDICARE

## 2025-04-02 PROCEDURE — 97140 MANUAL THERAPY 1/> REGIONS: CPT | Performed by: PHYSICAL THERAPIST

## 2025-04-02 PROCEDURE — 97110 THERAPEUTIC EXERCISES: CPT | Performed by: PHYSICAL THERAPIST

## 2025-04-02 NOTE — PROGRESS NOTES
Patient: Renetta Devine (80 year old, female) Referring Provider:  Insurance:   Diagnosis: R RC tear No ref. provider found  MEDICARE   Date of Surgery: none Next MD visit:  JORGE DAMIAN INDEMNITY   Precautions:    April Referral Information:    Date of Evaluation: Req: 0, Auth: 0, Exp:     03/31/25 POC Auth Visits:  8       Today's Date   4/2/2025    Subjective  doing HEP, R shoulder feeling better, occ twinges. pt wishes to go over machines in gym       Pain: 1/10     Objective  reviewed HEP, instructed pt in resistance and reps on machines in gym.            Assessment  progressing with recovery from RC tear.    Goals (to be met in 8 visits)   Long term goals to be reached in 8 visits.  Pt. will report decreased pain from 4/10 to 2/10 at worst.  Increase active range of motion of R shoulder flex  to 160 degrees to allow reaching into kitchen cabinets.    Increase strength of R shoulder flex, abd to 4+/5 to allow pt to lift 4# above shoulder height.  LEISURE:  Pt will be able to return to previous level of function for leisure activities  Pt able to return to regular resistance training at gym without pain   Pt. will be independent with home exercise program and self management.          Plan  cont current HEP, gently progress resistance training to restore strength and function R shoulder before return to ortho for follow up    Treatment Last 4 Visits  Treatment Day: 2       3/31/2025 4/2/2025   UE Treatment   Therapeutic Exercise Pulleys flex x 4 min  Bicep curl 4# 3x10  YTB triceps press 3x10 UBE 6 min, L4  Chest flyes 20# 2x10  Lat pull 25# x 15  Triceps press 25# x 15  Cable pulley lat pull 12.5# x 15  Supine cane press, abd and flex x 30 ea   Manual Therapy  R GHJ inf/post glide gr 3 x 6 min  STM to R deltoid, pecs, supra/infraspinatus x 4 min   Therapeutic Exercise Minutes 12 34   Manual Therapy Minutes  10   Evaluation Minutes 32    Total Time Of Timed Procedures 12 44   Total Time Of Service-Based  Procedures 32 0   Total Treatment Time 44 44   HEP AROM shoulder flex/abd, biceps and triceps Cont current        HEP  Cont current    Charges  TE2, MT1

## 2025-04-07 ENCOUNTER — APPOINTMENT (OUTPATIENT)
Dept: PHYSICAL THERAPY | Age: 80
End: 2025-04-07
Payer: MEDICARE

## 2025-04-24 ENCOUNTER — OFFICE VISIT (OUTPATIENT)
Dept: PHYSICAL THERAPY | Age: 80
End: 2025-04-24
Attending: ORTHOPAEDIC SURGERY
Payer: MEDICARE

## 2025-04-24 PROCEDURE — 97112 NEUROMUSCULAR REEDUCATION: CPT | Performed by: PHYSICAL THERAPIST

## 2025-04-24 PROCEDURE — 97110 THERAPEUTIC EXERCISES: CPT | Performed by: PHYSICAL THERAPIST

## 2025-04-24 PROCEDURE — 97140 MANUAL THERAPY 1/> REGIONS: CPT | Performed by: PHYSICAL THERAPIST

## 2025-04-24 NOTE — PROGRESS NOTES
Patient: Renetta Devine (80 year old, female) Referring Provider:  Insurance:   Diagnosis: R RC tear No ref. provider found  MEDICARE   Date of Surgery: none Next MD visit:  JORGE DAMIAN INDEMNITY   Precautions:    April Referral Information:    Date of Evaluation: Req: 0, Auth: 0, Exp:     03/31/25 POC Auth Visits:  8     DISCHARGE NOTE  Today's Date   4/24/2025    Subjective  doing HEP, R shoulder feeling better, occ twinges. pt functional, has to put R arm first into sleeve when dressing, but sleep not disturbed, still lifting weights with good tolerance.       Pain: 0/10     Objective  pt demonstrates improved ROM and strength as noted, is functional. Testing indicates that R biceps is the likely cause of any remaining discomfort. pt repeatedly lifts 4# overhead without pain.       Musculoskeletal    Special Tests: + empty can   + Speed's test     ROM and Strength (* denotes performed with pain)  Shoulder   ROM MMT (-/5)    R L R L     Flex 160 160 4+ 5     Ext 40 40 5 5     Abd (C5) 150 150 4 5     IR T9 T7 5 5     ER 70 70 5- 5     Low Trap n/a         Mid Trap n/a         SA n/a         ,   Elbow   ROM MMT (-/5)    R L R L     Flex (C6) full full 5- 5     Ext (C7) full full 5 5     Pronation             Supination                 Neurological:  Sensation: intact to light touch          Assessment  Doing well functionally, goals met, ready for discharge to [facility].  QuickDASH Outcome Score  Score: (Patient-Rptd) 18.18 % (10/12/2024  9:14 PM)    Post QuickDASH Outcome Score  Post Score: (Patient-Rptd) 6.82 % (4/24/2025  8:25 AM)    11.36 % improvement   Goals (to be met in 8 visits)   Long term goals to be reached in 8 visits.  Pt. will report decreased pain from 4/10 to 2/10 at worst.- MET  Increase active range of motion of R shoulder flex  to 160 degrees to allow reaching into kitchen cabinets.  - MET  Increase strength of R shoulder flex, abd to 4+/5 to allow pt to lift 4# above shoulder height.-  MET  LEISURE:  Pt will be able to return to previous level of function for leisure activities- MET  Pt able to return to regular resistance training at gym without pain - MET  Pt. will be independent with home exercise program and self management. - MET             Plan  Discharge to HEP    Treatment Last 4 Visits  Treatment Day: 3       3/31/2025 4/2/2025 4/24/2025   UE Treatment   Therapeutic Exercise Pulleys flex x 4 min  Bicep curl 4# 3x10  YTB triceps press 3x10 UBE 6 min, L4  Chest flyes 20# 2x10  Lat pull 25# x 15  Triceps press 25# x 15  Cable pulley lat pull 12.5# x 15  Supine cane press, abd and flex x 30 ea UBE 6 min, L4  Chest flyes 20# 2x10  Lat pull 25# x 15  Triceps press 25# x 15  Cable pulley lat pull 12.5# x 15  Supine cane press, abd and flex x 30 ea     Manual Therapy  R GHJ inf/post glide gr 3 x 6 min  STM to R deltoid, pecs, supra/infraspinatus x 4 min R GHJ inf/post glide gr 3 x 6 min  STM to R deltoid, pecs, supra/infraspinatus x 4 min     Therapeutic Exercise Minutes 12 34 32   Manual Therapy Minutes  10 10   Evaluation Minutes 32     Total Time Of Timed Procedures 12 44 42   Total Time Of Service-Based Procedures 32 0 0   Total Treatment Time 44 44 42   HEP AROM shoulder flex/abd, biceps and triceps Cont current         HEP  Cont current    Charges  TE2, MT1

## 2025-04-29 ENCOUNTER — APPOINTMENT (OUTPATIENT)
Dept: PHYSICAL THERAPY | Age: 80
End: 2025-04-29
Payer: MEDICARE

## 2025-05-05 ENCOUNTER — APPOINTMENT (OUTPATIENT)
Dept: PHYSICAL THERAPY | Age: 80
End: 2025-05-05
Payer: MEDICARE